# Patient Record
Sex: MALE | Race: WHITE | NOT HISPANIC OR LATINO | Employment: OTHER | URBAN - METROPOLITAN AREA
[De-identification: names, ages, dates, MRNs, and addresses within clinical notes are randomized per-mention and may not be internally consistent; named-entity substitution may affect disease eponyms.]

---

## 2018-04-26 ENCOUNTER — OFFICE VISIT CONVERTED (OUTPATIENT)
Dept: OTHER | Facility: HOSPITAL | Age: 63
End: 2018-04-26
Attending: INTERNAL MEDICINE

## 2018-06-08 ENCOUNTER — OFFICE VISIT CONVERTED (OUTPATIENT)
Dept: CARDIOLOGY | Facility: CLINIC | Age: 63
End: 2018-06-08
Attending: SPECIALIST

## 2018-06-08 ENCOUNTER — CONVERSION ENCOUNTER (OUTPATIENT)
Dept: CARDIOLOGY | Facility: CLINIC | Age: 63
End: 2018-06-08

## 2018-08-14 ENCOUNTER — OFFICE VISIT CONVERTED (OUTPATIENT)
Dept: CARDIOLOGY | Facility: CLINIC | Age: 63
End: 2018-08-14
Attending: SPECIALIST

## 2018-08-23 ENCOUNTER — OFFICE VISIT CONVERTED (OUTPATIENT)
Dept: OTHER | Facility: HOSPITAL | Age: 63
End: 2018-08-23
Attending: INTERNAL MEDICINE

## 2018-09-13 ENCOUNTER — OFFICE VISIT CONVERTED (OUTPATIENT)
Dept: OTHER | Facility: HOSPITAL | Age: 63
End: 2018-09-13
Attending: INTERNAL MEDICINE

## 2018-12-11 ENCOUNTER — OFFICE VISIT CONVERTED (OUTPATIENT)
Dept: CARDIOLOGY | Facility: CLINIC | Age: 63
End: 2018-12-11
Attending: SPECIALIST

## 2019-01-17 ENCOUNTER — OFFICE VISIT CONVERTED (OUTPATIENT)
Dept: OTHER | Facility: HOSPITAL | Age: 64
End: 2019-01-17
Attending: INTERNAL MEDICINE

## 2019-04-12 ENCOUNTER — OFFICE VISIT CONVERTED (OUTPATIENT)
Dept: CARDIOLOGY | Facility: CLINIC | Age: 64
End: 2019-04-12
Attending: SPECIALIST

## 2019-07-18 ENCOUNTER — OFFICE VISIT CONVERTED (OUTPATIENT)
Dept: OTHER | Facility: HOSPITAL | Age: 64
End: 2019-07-18
Attending: INTERNAL MEDICINE

## 2019-10-15 ENCOUNTER — OFFICE VISIT CONVERTED (OUTPATIENT)
Dept: CARDIOLOGY | Facility: CLINIC | Age: 64
End: 2019-10-15
Attending: SPECIALIST

## 2019-10-15 ENCOUNTER — CONVERSION ENCOUNTER (OUTPATIENT)
Dept: OTHER | Facility: HOSPITAL | Age: 64
End: 2019-10-15

## 2019-12-19 ENCOUNTER — OFFICE VISIT CONVERTED (OUTPATIENT)
Dept: OTHER | Facility: HOSPITAL | Age: 64
End: 2019-12-19
Attending: INTERNAL MEDICINE

## 2020-04-28 ENCOUNTER — TELEPHONE CONVERTED (OUTPATIENT)
Dept: CARDIOLOGY | Facility: CLINIC | Age: 65
End: 2020-04-28
Attending: SPECIALIST

## 2020-07-28 ENCOUNTER — OFFICE VISIT CONVERTED (OUTPATIENT)
Dept: CARDIOLOGY | Facility: CLINIC | Age: 65
End: 2020-07-28
Attending: SPECIALIST

## 2020-07-28 ENCOUNTER — CONVERSION ENCOUNTER (OUTPATIENT)
Dept: CARDIOLOGY | Facility: CLINIC | Age: 65
End: 2020-07-28

## 2020-07-30 ENCOUNTER — OFFICE VISIT CONVERTED (OUTPATIENT)
Dept: OTHER | Facility: HOSPITAL | Age: 65
End: 2020-07-30
Attending: INTERNAL MEDICINE

## 2021-02-26 ENCOUNTER — OFFICE VISIT CONVERTED (OUTPATIENT)
Dept: CARDIOLOGY | Facility: CLINIC | Age: 66
End: 2021-02-26
Attending: SPECIALIST

## 2021-04-19 ENCOUNTER — HOSPITAL ENCOUNTER (OUTPATIENT)
Dept: MRI IMAGING | Facility: HOSPITAL | Age: 66
Discharge: HOME | End: 2021-04-19
Attending: ANESTHESIOLOGY

## 2021-05-13 NOTE — PROGRESS NOTES
Progress Note      Patient Name: Beltran Ambrose   Patient ID: [id number]   Sex: Male   YOB: 1955    Primary Care Provider: Darrius Cooper MD   Referring Provider: Edgar Sprague MD    Visit Date: July 28, 2020    Provider: Edgar Sprague MD   Location: [facility]   Location Address: [address] [place] [address], [facility]   [place]  [id number]   Location Phone: [phone]          Chief Complaint  · Coronary artery disease   · Atrial fibrillation       History Of Present Illness  Beltran Ambrose is a 65-year-old male with history of coronary artery disease, status post coronary artery bypass graft surgery. Denies any chest pain or shortness of breath.   CURRENT MEDICATIONS: include Pantoprazole 40 mg daily; magnesium 400 mg daily; Metoclopramide 10 mg b.i.d.; Atenolol 25 mg daily; Diltiazem 50 mg b.i.d.; Eliquis 5 mg b.i.d.; Crestor 40 mg daily; Amitriptyline 50 mg daily; Fenofibrate 160 mg daily; Levothyroxine 137 mcg daily; Lantus; Humalog; vitamin D; Allopurinol 100 mg b.i.d.; folic acid 1 mg daily. The dosage and frequency of the medications were reviewed with the patient.   PAST MEDICAL HISTORY: Atrial fibrillation; chronic renal failure; coronary artery disease with CABG; hyperlipidemia.   FAMILY HISTORY: Positive for diabetes mellitus. Negative for hypertension and heart disease.   PSYCHOSOCIAL HISTORY: He does not drink alcohol and does not use tobacco.       Review of Systems  · Cardiovascular  o Admits  o : swelling (feet, ankles, hands)  o Denies  o : palpitations (fast, fluttering, or skipping beats), shortness of breath while walking or lying flat, chest pain or angina pectoris   · Respiratory  o Denies  o : chronic or frequent cough, asthma or wheezing      Vitals  Date Time BP Position Site L\R Cuff Size HR RR TEMP (F) WT  HT  BMI kg/m2 BSA m2 O2 Sat HC       07/28/2020 10:15 /62 Sitting    68 - R   260lbs 0oz 6'   35.26 2.45           Physical  Examination  · Constitutional  o Appearance  o : Awake, alert, cooperative, pleasant.  · Respiratory  o Inspection of Chest  o : No chest wall deformities, moving equal.  o Auscultation of Lungs  o : Good air entry with vesicular breath sounds.  · Cardiovascular  o Heart  o :   § Auscultation of Heart  § : S1 and S2 regular. No S3. No S4. No murmurs.  o Peripheral Vascular System  o :   § Extremities  § : Peripheral pulses were well felt. No edema. No cyanosis.  · Gastrointestinal  o Abdominal Examination  o : No masses or organomegaly noted.  · EKG  o EKG  o : Done today.  o Indications  o : Atrial fibrillation.  o Results  o : Shows atrial fibrillation with sinus rhythm. Nonspecific ST-T wave changes.           Assessment     ASSESSMENT AND PLAN:  1.  Coronary artery disease, status post coronary artery bypass graft surgery, stable.  Continue current dose of        Atenolol.     2.  Paroxysmal atrial fibrillation, now in sinus rhythm.  Continue Eliquis for stroke prevention.  Continue        Atenolol for rate control.   3.  Hyperlipidemia.  Managed by PMD.  4.  See me back in six months.      MD RITO Kolb/lissy    This note was transcribed by Rubi Vargas.  lissy/RITO  The above service was transcribed by Rubi Vargas, and I attest to the accuracy of the note.  RITO             Electronically Signed by: Yana Vargas-, Other -Author on July 29, 2020 01:54:02 PM  Electronically Co-signed by: Edgar Sprague MD -Reviewer on August 15, 2020 09:38:45 AM

## 2021-05-13 NOTE — PROGRESS NOTES
Quick Note      Patient Name: Beltran Ambrose   Patient ID: [id number]   Sex: Male   YOB: 1955    Primary Care Provider: Darrius Cooper MD   Referring Provider: Edgar Sprague MD    Visit Date: April 28, 2020    Provider: Edgar Sprague MD   Location: [facility]   Location Address: [address] [place] [address] A   [place]  [id number]   Location Phone: [phone]          History Of Present Illness  TELEHEALTH TELEPHONE VISIT  Chief Complaint: Coronary artery disease, Atrial fibrillation   Beltran Ambrose is a 65 year old /White male with a history of coronary artery disease with coronary artery bypass graft surgery. Denies any chest pain. No shortness of breath. Blood pressure is controlled at home. He is presenting for evaluation via telehealth telephone visit. Verbal consent obtained before beginning visit. Telehealth visit due to COVID-19.   Provider spent 5 minutes with the patient during telehealth visit.   The following staff were present during this visit: Provider; Rachael Zurita MA   Past Medical History/Overview of Patient Symptoms     CURRENT MEDICATIONS:  Allopurinol 100 mg b.i.d.; atenolol 25 mg daily; diltiazem 60 mg b.i.d.; Eliquis 5 mg b.i.d.; Crestor 40 mg daily; fenofibrate 160 mg daily; levothyroxine 137 mcg daily; pantoprazole 40 mg daily; magnesium 400 mg t.i.d.; metoclopramide 10 mg b.i.d.; amitriptyline 50 mg daily; Lantus; Humalog; vitamin D; Co-Q10.  Dosage and frequency of the medications were reviewed with the patient.      PAST MEDICAL HISTORY:  Atrial fibrillation; Chronic renal failure; Coronary artery disease with CABG; Hyperlipidemia.      FAMILY HISTORY:  Positive for diabetes mellitus.  Negative for hypertension or heart disease.      PSYCHOSOCIAL HISTORY:  Denies mood changes or depression.  Denies alcohol or tobacco use.      REVIEW OF SYSTEMS:    Cardiovascular:  Denies palpitations (fast, fluttering, or skipping beats),  "swelling (feet, ankles, hands), shortness of breath while walking or lying flat, chest pain or angina pectoris   Respiratory: Denies chronic or frequent cough, asthma or wheezing       Vitals     Per patient, at-home vitals:    Blood pressure 130/76.  Heart rate 81.  Height 6'0\".  Weight 265.           Assessment     ASSESSMENT & PLAN:    1.  Coronary artery disease with history of coronary artery bypass graft surgery, stable.  Continue current dose        of atenolol.    2.  Paroxysmal atrial fibrillation, now in sinus rhythm.  Continue Eliquis for stroke prevention.  Continue atenolol        for rate control.    3.  Hyperlipidemia.  Managed by his PMD.  Continue current dose of Crestor and fenofibrate.  4.  See me back in 6 months.             Electronically Signed by: Alivia Blake-, Other -Author on April 30, 2020 02:40:32 PM  Electronically Co-signed by: Edgar Sprague MD -Reviewer on May 7, 2020 08:52:41 AM  "

## 2021-05-14 VITALS
DIASTOLIC BLOOD PRESSURE: 40 MMHG | HEIGHT: 72 IN | BODY MASS INDEX: 37.25 KG/M2 | HEART RATE: 66 BPM | WEIGHT: 275 LBS | SYSTOLIC BLOOD PRESSURE: 140 MMHG

## 2021-05-14 NOTE — PROGRESS NOTES
Progress Note      Patient Name: Beltran Ambrose   Patient ID: [id number]   Sex: Male   YOB: 1955    Primary Care Provider: Darrius Cooper MD   Referring Provider: Edgar Sprague MD    Visit Date: February 26, 2021    Provider: Edgar Sprague MD   Location: [facility]   Location Address: [address] [place] [address], [address]   [place]  [id number]   Location Phone: [phone]          Chief Complaint  · Coronary artery disease   · Atrial fibrillation       History Of Present Illness  Beltran Ambrose is a 65-year-old male with history of coronary artery disease, status post coronary artery bypass graft surgery. Denies chest pain or shortness of breath.   CURRENT MEDICATIONS: Medications have been reviewed and are as stated.   PAST MEDICAL HISTORY: Atrial fibrillation; chronic renal failure; coronary artery disease with CABG; hyperlipidemia.   PSYCHOSOCIAL HISTORY: Denies alcohol use.      ALLERGIES: No known drug allergies.       Review of Systems  · Cardiovascular  o Admits  o : swelling (feet, ankles, hands), chest pain or angina pectoris   o Denies  o : palpitations (fast, fluttering, or skipping beats), shortness of breath while walking or lying flat  · Respiratory  o Denies  o : chronic or frequent cough      Vitals  Date Time BP Position Site L\R Cuff Size HR RR TEMP (F) WT  HT  BMI kg/m2 BSA m2 O2 Sat FR L/min FiO2 HC       02/26/2021 09:22 /40 Sitting    66 - R   275lbs 0oz 6'   37.3 2.52             Physical Examination  · Constitutional  o Appearance  o : Awake, alert, cooperative, pleasant.  · Respiratory  o Inspection of Chest  o : No chest wall deformities, moving equal.  o Auscultation of Lungs  o : Good air entry with vesicular breath sounds.  · Cardiovascular  o Heart  o :   § Auscultation of Heart  § : S1 and S2 regular. No S3. No S4. No murmurs.  o Peripheral Vascular System  o :   § Extremities  § : Peripheral pulses were well felt. No edema. No  cyanosis.  · Gastrointestinal  o Abdominal Examination  o : No masses or organomegaly noted.          Assessment     ASSESSMENT AND PLAN:  1.  Paroxysmal atrial fibrillation, in sinus rhythm. Continue Eliquis for stroke prevention. Continue Atenolol for        rate control.   2.  Coronary artery disease, status post  coronary artery bypass graft surgery, stable. Continue current dose of        Atenolol.   3.  See me back in 6 months.      Edgar Sprague MD  RITO/pap                Electronically Signed by: Yana Vargas-, Other -Author on March 10, 2021 11:24:01 AM  Electronically Co-signed by: Edgar Sprague MD -Reviewer on March 15, 2021 11:56:43 AM

## 2021-05-15 VITALS
HEART RATE: 66 BPM | BODY MASS INDEX: 34.54 KG/M2 | HEIGHT: 72 IN | SYSTOLIC BLOOD PRESSURE: 132 MMHG | WEIGHT: 255 LBS | DIASTOLIC BLOOD PRESSURE: 66 MMHG

## 2021-05-15 VITALS
WEIGHT: 265 LBS | HEART RATE: 66 BPM | HEIGHT: 72 IN | BODY MASS INDEX: 35.89 KG/M2 | SYSTOLIC BLOOD PRESSURE: 134 MMHG | DIASTOLIC BLOOD PRESSURE: 54 MMHG

## 2021-05-15 VITALS
BODY MASS INDEX: 35.21 KG/M2 | DIASTOLIC BLOOD PRESSURE: 48 MMHG | HEIGHT: 72 IN | HEART RATE: 64 BPM | SYSTOLIC BLOOD PRESSURE: 114 MMHG | WEIGHT: 260 LBS

## 2021-05-15 VITALS
BODY MASS INDEX: 35.21 KG/M2 | SYSTOLIC BLOOD PRESSURE: 132 MMHG | WEIGHT: 260 LBS | DIASTOLIC BLOOD PRESSURE: 62 MMHG | HEART RATE: 68 BPM | HEIGHT: 72 IN

## 2021-05-16 VITALS
SYSTOLIC BLOOD PRESSURE: 130 MMHG | DIASTOLIC BLOOD PRESSURE: 66 MMHG | BODY MASS INDEX: 34.27 KG/M2 | WEIGHT: 253 LBS | HEIGHT: 72 IN | HEART RATE: 64 BPM

## 2021-05-16 VITALS
BODY MASS INDEX: 34.67 KG/M2 | DIASTOLIC BLOOD PRESSURE: 58 MMHG | HEART RATE: 74 BPM | WEIGHT: 256 LBS | HEIGHT: 72 IN | SYSTOLIC BLOOD PRESSURE: 130 MMHG

## 2021-05-28 VITALS
RESPIRATION RATE: 20 BRPM | HEART RATE: 76 BPM | OXYGEN SATURATION: 93 % | WEIGHT: 260.19 LBS | HEART RATE: 72 BPM | SYSTOLIC BLOOD PRESSURE: 122 MMHG | HEART RATE: 67 BPM | OXYGEN SATURATION: 95 % | HEIGHT: 72 IN | RESPIRATION RATE: 20 BRPM | BODY MASS INDEX: 34.89 KG/M2 | BODY MASS INDEX: 34.48 KG/M2 | OXYGEN SATURATION: 96 % | WEIGHT: 257.6 LBS | HEIGHT: 72 IN | TEMPERATURE: 97.8 F | DIASTOLIC BLOOD PRESSURE: 64 MMHG | DIASTOLIC BLOOD PRESSURE: 83 MMHG | SYSTOLIC BLOOD PRESSURE: 121 MMHG | HEIGHT: 72 IN | OXYGEN SATURATION: 96 % | SYSTOLIC BLOOD PRESSURE: 130 MMHG | HEART RATE: 73 BPM | WEIGHT: 255.19 LBS | HEIGHT: 72 IN | TEMPERATURE: 97.2 F | HEART RATE: 60 BPM | WEIGHT: 265.5 LBS | HEART RATE: 67 BPM | OXYGEN SATURATION: 94 % | TEMPERATURE: 97 F | BODY MASS INDEX: 34.56 KG/M2 | DIASTOLIC BLOOD PRESSURE: 64 MMHG | BODY MASS INDEX: 34.42 KG/M2 | DIASTOLIC BLOOD PRESSURE: 58 MMHG | TEMPERATURE: 98.2 F | BODY MASS INDEX: 34.48 KG/M2 | SYSTOLIC BLOOD PRESSURE: 135 MMHG | BODY MASS INDEX: 35.24 KG/M2 | BODY MASS INDEX: 35.96 KG/M2 | TEMPERATURE: 97.3 F | TEMPERATURE: 97 F | SYSTOLIC BLOOD PRESSURE: 135 MMHG | HEIGHT: 72 IN | DIASTOLIC BLOOD PRESSURE: 79 MMHG | HEIGHT: 72 IN | DIASTOLIC BLOOD PRESSURE: 56 MMHG | HEIGHT: 72 IN | WEIGHT: 254.6 LBS | WEIGHT: 254.12 LBS | SYSTOLIC BLOOD PRESSURE: 128 MMHG | DIASTOLIC BLOOD PRESSURE: 70 MMHG | SYSTOLIC BLOOD PRESSURE: 143 MMHG | OXYGEN SATURATION: 97 % | RESPIRATION RATE: 20 BRPM | OXYGEN SATURATION: 98 % | WEIGHT: 254.6 LBS | TEMPERATURE: 97.8 F | HEART RATE: 66 BPM

## 2021-05-28 NOTE — PROGRESS NOTES
Patient: SAYRA GUTIERREZ     Acct: [id number]     Report: #[id number]  UNIT #: [id number]     : 1955    Encounter Date:2018  PRIMARY CARE:   ***Signed***  --------------------------------------------------------------------------------------------------------------------  DATE: 18      Primary Care Provider      Primary Care Provider:  ALYSSA MCGARRY            Referring Physician      Referring Provider:  ALYSSA MCGARRY            Chief Complaint      Follow up Anemia            Subjective      63-year-old white male has a history of anemia after a long history of     complicated medical problems.  Anemia persists however workups have been     negative.      Patient has been doing well.            Patient undergo plasmapheresis for low density lipoprotein disease.            Past Med/Surg History            Past Med/Surg History:   No Hypertension             Diabetes Mellitus             Heart Disease             Blood Clots             No Cancer             No Lung Disease             No Kidney Disease             No Other            Social History      Social History:  No Tobacco Use (quit in ), No Alcohol Use, No Recreational     Drug use, No Other            Allergies      Coded Allergies:             DOPAMINE (Verified  Allergy, Severe, CHEST PAIN, ST ELEVATION, 9/1/15)           Cultivated Oat Pollen (Verified  Allergy, Unknown, NASAL CONGESTION, 9/1/15    )           GRASS POLLEN-ORCHARD GRASS,STD (Verified  Allergy, Unknown, NASAL     CONGESTION, 9/1/15)           OXYCODONE (Verified  Adverse Reaction, Severe, ALOT OF CONFUSION, 9/1/15)            Medications      Medications    Last Reconciled on 18 14:10 by LOU OROZCO MD      Baclofen (Baclofen*) 10 Mg Tablet      10 MG PO BID, #60 TAB 0 Refills         Reported         18       Allopurinol (Zyloprim*) 100 Mg Tablet      100 MG PO BID for 30 Days, #60 TAB 0 Refills         Reported         18       Insulin Glargine  (Lantus VIAL) 100 Units/Ml Vial      20 UNITS SUBQ BID INSULIN, #1 VIAL 0 Refills         Reported         4/26/18       (L-Thyroxin)   No Conflict Check      137 MCG QDAY Y for ANXIETY         Reported         4/26/18       Fenofibrate (Fenofibrate*) 160 Mg Tablet      160 MG PO QDAY, TAB         Reported         4/26/18       Amitriptyline HCl (Amitriptyline HCl) 25 Mg Tablet      25 MG PO HS, #30 TAB         Reported         4/26/18       Rosuvastatin Calcium (Crestor*) 40 Mg Tablet      40 MG PO HS, #30 TAB 0 Refills         Reported         4/26/18       Levothyroxine (Levothyroxine) 0.112 Mg Tablet      0.112 MG PO QDAY@07, #30 TAB 0 Refills         Reported         4/26/18       Atenolol (Atenolol*) 25 Mg Tablet      25 MG PO QDAY, #30 TAB 0 Refills         Reported         4/26/18       Aspirin (Aspirin*) 81 Mg Tab.chew      81 MG PO QDAY, #30 TAB.CHEW 0 Refills         Reported         4/26/18       Clopidogrel Bisulfate (Plavix) 75 Mg Tablet      75 MG PO QDAY, #30 TAB 0 Refills         Reported         4/26/18       Metoclopramide (Metoclopramide) 10 Mg Tablet      10 MG PO TID, TAB         Reported         4/26/18       Hydrocodone/Acetaminophen 7.5/325 MG (Hydrocodone/Acetaminophen 7.5/325 MG) 1     Each Tablet      1 TAB PO Q4-6H Y for BREAKTHROUGH PAIN, TAB         Reported         4/26/18       Cyanocobalamin (Cyanocobalamin Injection) 1,000 Mcg/1 Ml Vial      1000 MCG IM Q30DAY, #1 VIAL         Reported         4/26/18       Magnesium Amino Acid Chelate (Magnesium*) 100 Mg Tablet      100 MG PO QID, TAB         Reported         4/26/18       Oxymetazoline Hcl (Afrin Adult Strength) 15 Ml [place]      1 SPRAYS NARE EACH BID Y for CONGESTION, #1 BOTTLE         Reported         4/26/18       Pantoprazole (Protonix*) 40 Mg Tablet.dr      40 MG PO QDAY@07, #30 TAB 0 Refills         Reported         4/26/18      Current Medications      Current Medications Reviewed 4/26/18            Pain Assessment       Pain Intensity:  0      Description:  None            Review of Systems      General:  No Anxiety, No Fatigue Scale:, No Pain Scale:, No Fever, No Other      HEENT:  No Dysphagia, No Hearing Changes, No Rhinorrhea, No Tinnitus, No Visual     Changes, No Nasal Congestion, No Epistaxis, No Other      Respiratory:  No Cough, No Shortness of Air, No Sputum Changes, No Wheezing, No     Hemoptysis, No Congestion, No Other      Cardiovascular:  No Chest Pain, No Pedal Edema, No Orthopnea, No Palpitations,     No Chest Pressure, No Dizziness, No Other      Gastrointestinal:  No Nausea, No Vomiting, No Dysphagia, No Constipation, No     Diarrhea, Appetite Good, No Appetite Fair, No Appetite Poor, No Early Satiety,     No Other      Genitourinary:  Nocturia (2 or 3), No Dysuria, No Other      Musculoskeletal:  No Joint Effusions, Joint Tenderness, Joint Stiffness (right     hand), No Myalgias, Aches, Pains, No Other      Endocrine:  No Heat Intolerance, No Cold Intolerance, No Fatigue, No Blood     Sugar Control, No Other      Hematologic:  Bleeding, No Bruising, No Swollen Glands, No Other      Allergic/Immunologic:  No Hives, No Throat closing off, No Nasal drip, No Itchy     eyes, No Hay fever, No Other      Psychological:  No Anxiety, No Depression, No Other      Neurological:  No Headaches, No Dizziness, No Weakness, No Numbness, No Other      Skin:  No Rash, No Open Wounds, No Edema, No Other            Vitals      Height 6 ft 0 in / 182.88 cm      Weight 260 lbs 3 oz / 118.922025 kg      BSA 2.49 m2      BMI 35.3 kg/m2      Temperature 97.3 F / 36.28 C - Temporal      Pulse 73      Blood Pressure 135/83 Sitting, Left Arm      Pulse Oximetry 96%, room air            Exam      Constitutional:  No acute distress, Conversant, Pleasant, No Weakness      Eyes:  Anicteric sclerae, Palpebral Conjunctivae, YINA      HENT:  Oropharynx clear, No Erythema, Buccal mucosae (pink)      Neck:  Supple, Full Range of Motion       Cardiovascular:  RRR, No Murmurs, Normal PMI, No Peripheral Edema      Lungs:  Clear to Ausculation, Normal Respiratory Effort      Abdomen:  Soft, NABS, No Tenderness      Chest:  Other (symmetrical and equal)      Lymphatic:  No Neck      Extremities:  No digital cyanosis, Normal gait, Other (no deformity no mutation     range of motion)      Neurological:  Cranial Nerve II-XII (Intact), No Focal Sensory deficits      Psychological:  Appropriate affect, Intact judgement, Alert      Skin:  Other (no dermatosis)            Lab Results      White count 5, hemoglobin 11.4, hematocrit 34.2, normal indicis, platelet count     112,000      Iron profile normal, ferritin elevated at 420 specimen lipemic            Impression/Problem List      Normochromic normocytic anemia      Low-density lipoprotein disease undergoing plasmapheresis      Diabetes mellitus      Hypertension      Coronary artery disease      Hypothyroidism      Obstructive sleep apnea      Gout      Hypomagnesemia      Notes      New Medications      * PANTOPRAZOLE (Protonix*) 40 MG TABLET.DR: 40 MG PO QDAY@07 #30       Instructions: Take on an empty stomach.      * OXYMETAZOLINE HCL (Afrin Adult Strength) 15 ML SPRAY: 1 SPRAYS NARE EACH BID     PRN CONGESTION #1      * Magnesium Amino Acid Chelate (Magnesium*) 100 MG TABLET: 100 MG PO QID      * Cyanocobalamin (Cyanocobalamin Injection) 1,000 MCG/1 ML VIAL: 1,000 MCG IM     Q30DAY #1      * Hydrocodone/Acetaminophen 7.5/325 MG 1 EACH TABLET: 1 TAB PO Q4-6H PRN     BREAKTHROUGH PAIN      * Metoclopramide 10 MG TABLET: 10 MG PO TID      * Clopidogrel Bisulfate (Plavix) 75 MG TABLET: 75 MG PO QDAY #30      * Aspirin (Aspirin*) 81 MG TAB.CHEW: 81 MG PO QDAY #30      * ATENOLOL (Atenolol*) 25 MG TABLET: 25 MG PO QDAY #30      * Levothyroxine 0.112 MG TABLET: 0.112 MG PO QDAY@07 #30      * Rosuvastatin Calcium (Crestor*) 40 MG TABLET: 40 MG PO HS #30      * Amitriptyline HCl 25 MG TABLET: 25 MG PO HS #30       * Fenofibrate (Fenofibrate*) 160 MG TABLET: 160 MG PO QDAY      * (L-Thyroxin): 137 MCG QDAY PRN ANXIETY      * INSULIN GLARGINE (Lantus VIAL) 100 UNITS/ML VIAL: 20 UNITS SUBQ BID INSULIN #1      * ALLOPURINOL (Zyloprim*) 100 MG TABLET: 100 MG PO BID 30 Days #60      * BACLOFEN (Baclofen*) 10 MG TABLET: 10 MG PO BID #60            Plan      Continue to follow CBC.  Repeat anemia profile.  May need bone marrow studies     to determine etiology of anemia            Patient Education:        Anemia            PREVENTION      Hx Influenza Vaccination:  Yes (FALL 2014)      Date Influenza Vaccine Given:  Nov 13, 2017      2 or More Falls Past Year?:  No      Fall Past Year with Injury?:  No      Hx Pneumococcal Vaccination:  Yes (JUNE 2015)      Encouraged to follow-up with:  PCP regarding preventative exams.      Chart initiated by      Marely Cheek MA                 Disclaimer: Converted document may not contain table formatting or lab diagrams. Please see [facility] for the authenticated document.

## 2021-05-28 NOTE — PROGRESS NOTES
Patient: SAYRA GUTIERREZ     Acct: [id number]     Report: #[id number]  UNIT #: [id number]     : 1955    Encounter Date:2019  PRIMARY CARE:   ***Signed***  --------------------------------------------------------------------------------------------------------------------  DATE: 19      Primary Care Provider      Primary Care Provider:  ALYSSA MCGARRY            Referring Physician      Referring Provider:  ALYSSA MCGARRY            Chief Complaint      FU Anemia            Past Med/Surg History            Past Med/Surg History:   No Hypertension             Diabetes Mellitus             Heart Disease             Blood Clots             No Cancer             No Lung Disease             No Kidney Disease             No Other            Social History      Social History:  No Tobacco Use (quit in ), No Alcohol Use, No Recreational     Drug use, No Other            Allergies      Coded Allergies:             DOPAMINE (Verified  Allergy, Severe, CHEST PAIN, ST ELEVATION, 9/1/15)           Cultivated Oat Pollen (Verified  Allergy, Unknown, NASAL CONGESTION,     9/1/15)           GRASS POLLEN-ORCHARD GRASS,STD (Verified  Allergy, Unknown, NASAL C    ONGESTION, 9/1/15)           OXYCODONE (Verified  Adverse Reaction, Severe, ALOT OF CONFUSION, 9/1/15)            Medications      Medications    Last Reconciled on 18 14:48 by LOU OROZCO MD      Insulin Lispro (HumaLOG VIAL*) 100 Units/Ml Vial      20 UNITS SUBQ BID INSULIN, #1 VIAL 0 Refills         Reported         19       Levothyroxine (Levothyroxine) 0.137 Mg Tablet      0.137 MG PO QDAY@07, #30 TAB 0 Refills         Reported         19       Rosuvastatin Calcium (Crestor*) 40 Mg Tablet      40 MG PO QDAY, #30 TAB 0 Refills         Reported         19       Apixaban (Eliquis) 5 Mg Tablet      5 MG PO BID for 30 Days, #60 TAB         Reported         19       Diltiazem (Diltiazem) 60 Mg Tab      60 MG PO BID, TAB          Reported         1/17/19       Cyanocobalamin (Cyanocobalamin Injection) 1,000 Mcg/1 Ml Vial      1000 MCG IM Q30DAY, #1 VIAL         Reported         1/17/19       Magnesium Oxide (Magnesium Oxide*) 400 Mg Tablet      400 MG PO TID, #60 TAB 0 Refills         Reported         1/17/19       Allopurinol (Zyloprim*) 100 Mg Tablet      100 MG PO BID for 30 Days, #60 TAB 0 Refills         Reported         4/26/18       Insulin Glargine (Lantus VIAL) 100 Units/Ml Vial      20 UNITS SUBQ BID INSULIN, #1 VIAL 0 Refills         Reported         4/26/18       Fenofibrate (Fenofibrate*) 160 Mg Tablet      160 MG PO QDAY, TAB         Reported         4/26/18       Amitriptyline HCl (Amitriptyline HCl) 25 Mg Tablet      25 MG PO HS, #30 TAB         Reported         4/26/18       Atenolol (ATENOLOL) 25 Mg Tablet      25 MG PO QDAY, #30 TAB 0 Refills         Reported         4/26/18       Metoclopramide (Metoclopramide) 10 Mg Tablet      10 MG PO BID, TAB         Reported         4/26/18       Oxymetazoline Hcl (AFRIN) 15 Ml Spray      1 SPRAYS NARE EACH BID PRN for CONGESTION, #1 BOTTLE         Reported         4/26/18       Pantoprazole (Protonix*) 40 Mg Tablet.dr      40 MG PO QDAY@07, #30 TAB 0 Refills         Reported         4/26/18      Current Medications      Current Medications Reviewed 7/18/19            Pain Assessment      Pain Intensity:  0      Description:  None            Review of Systems      General:  Anxiety, Fatigue Scale: (0), Pain Scale: (8)      HEENT:  No Dysphagia      Respiratory:  No Cough; Shortness of Air, Wheezing      Cardiovascular:  No Chest Pain      Gastrointestinal:  No Nausea; Appetite Good (good)      Genitourinary:  No Nocturia      Musculoskeletal:  No Joint Effusions; Aches, Pains (back, legs)      Endocrine:  No Heat Intolerance      Hematologic:  No Bleeding      Allergic/Immunologic:  No Hives      Psychological:  Anxiety      Neurological:  No Headaches; Numbness ((L) hand)      Skin:   No Rash, No Open Wounds            Vitals      Height 6 ft 0 in / 182.88 cm      Weight 254 lbs 9.6 oz / 115.650448 kg      BSA 2.36 m2      BMI 34.5 kg/m2      Temperature 97.8 F / 36.56 C      Pulse 60      Blood Pressure 128/64      Pulse Oximetry 97%            Impression/Problem List            Pancytopenia new onset      Iron deficiency new-onset      Normochromic normocytic anemia      Low-density lipoprotein disease undergoing plasmapheresis      Diabetes mellitus      Hypertension      Coronary artery disease      Hypothyroidism      Obstructive sleep apnea      Gout      Hypomagnesemia            Chart Signed As is by S Administration      Notes      Changed Medications      * Metoclopramide 10 MG TABLET: 10 MG PO BID      * MAGNESIUM OXIDE (Magnesium Oxide*) 400 MG TABLET: 400 MG PO TID #60            Plan      Bone marrow biopsy and aspiration.  Procedure was discussed with patient and     wife including risks.  This will be done on Monday at 9 o'clock stent time.      [id number] mg IV to be given tomorrow      Ehrlichia serology; kacie mountain spotted fever serology      Ferrous sulfate 1 tablet 3 times a day      Vitamin C 500 mg 2 times a day      CT scan of the abdomen and pelvis for splenomegaly      Patient return in 2 weeks.            Patient Education:        Anemia            PREVENTION      Date Influenza Vaccine Given:  Nov 13, 2017      2 or More Falls Past Year?:  No      Fall Past Year with Injury?:  No      Encouraged to follow-up with:  PCP regarding preventative exams.      Chart initiated by      CARLA Hagen MA            Electronically signed by Christel Rahman  06/09/2020 13:05  Electronically signed by JOEY SOLIS  06/10/2020 07:43       Disclaimer: Converted document may not contain table formatting or lab diagrams. Please see [facility] for the authenticated document.

## 2021-05-28 NOTE — PROGRESS NOTES
Patient: SAYRA GUTIERREZ     Acct: [id number]     Report: #[id number]  UNIT #: [id number]     : 1955    Encounter Date:2018  PRIMARY CARE:   ***Signed***  --------------------------------------------------------------------------------------------------------------------  DATE: 18      Primary Care Provider      Primary Care Provider:  ALYSSA MCGARRY            Referring Physician      Referring Provider:  ALYSSA MCGARRY            Chief Complaint      Follow up Anemia            Subjective      63-year-old white male was initially referred to me for anemia after a very long    complicated medical history.  Patient had history of pancreatitis status post     pancreatectomy complicated by gallbladder rupture as well as a rupture of a     pancreatic cyst pseudocyst.  Patient had gastroparesis necessitating PEG tube     insertion.            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            Past Med/Surg History            Past Med/Surg History:   No Hypertension             Diabetes Mellitus             Heart Disease             Blood Clots             No Cancer             No Lung Disease             No Kidney Disease             No Other            Social History      Social History:  No Tobacco Use (quit in ), No Alcohol Use, No Recreational     Drug use, No Other            Allergies      Coded Allergies:             DOPAMINE (Verified  Allergy, Severe, CHEST PAIN, ST ELEVATION, 9/1/15)           Cultivated Oat Pollen (Verified  Allergy, Unknown, NASAL CONGESTION,     9/1/15)           GRASS POLLEN-ORCHARD GRASS,STD (Verified  Allergy, Unknown, NASAL     CONGESTION, 9/1/15)           OXYCODONE (Verified  Adverse Reaction, Severe, ALOT OF CONFUSION, 9/1/15)            Medications      Medications    Last Reconciled on 18 14:48 by LOU OROZCO MD      (martazaoine)   No Conflict Check      15 TAB PO         Reported         18       Allopurinol  (Zyloprim*) 100 Mg Tablet      100 MG PO BID for 30 Days, #60 TAB 0 Refills         Reported         4/26/18       Insulin Glargine (Lantus VIAL) 100 Units/Ml Vial      20 UNITS SUBQ BID INSULIN, #1 VIAL 0 Refills         Reported         4/26/18       Fenofibrate (Fenofibrate*) 160 Mg Tablet      160 MG PO QDAY, TAB         Reported         4/26/18       Amitriptyline HCl (Amitriptyline HCl) 25 Mg Tablet      25 MG PO HS, #30 TAB         Reported         4/26/18       Atenolol (Atenolol*) 25 Mg Tablet      25 MG PO QDAY, #30 TAB 0 Refills         Reported         4/26/18       Metoclopramide (Metoclopramide) 10 Mg Tablet      10 MG PO TID, TAB         Reported         4/26/18       Hydrocodone/Acetaminophen 7.5/325 MG (Hydrocodone/Acetaminophen 7.5/325 MG) 1     Each Tablet      1 TAB PO Q4-6H PRN for BREAKTHROUGH PAIN, TAB         Reported         4/26/18       Cyanocobalamin (Cyanocobalamin Injection) 1,000 Mcg/1 Ml Vial      1000 MCG IM Q30DAY, #1 VIAL         Reported         4/26/18       Oxymetazoline Hcl (Afrin Adult Strength) 15 Ml Mason      1 SPRAYS NARE EACH BID PRN for CONGESTION, #1 BOTTLE         Reported         4/26/18       Pantoprazole (Protonix*) 40 Mg Tablet.dr      40 MG PO QDAY@07, #30 TAB 0 Refills         Reported         4/26/18      Current Medications      Current Medications Reviewed 8/23/18            Pain Assessment      Pain Intensity:  0      Description:  None            Review of Systems      General:  No Anxiety, No Fatigue Scale:, No Pain Scale:, No Fever, No Other      HEENT:  No Dysphagia, No Hearing Changes, No Rhinorrhea, No Tinnitus, No Visual     Changes, No Nasal Congestion, No Epistaxis, No Other      Respiratory:  No Cough, No Shortness of Air, No Sputum Changes, No Wheezing, No     Hemoptysis, No Congestion, No Other      Cardiovascular:  No Chest Pain, No Pedal Edema, No Orthopnea, No Palpitations,     No Chest Pressure, No Dizziness, No Other      Gastrointestinal:   No Nausea, No Vomiting, No Dysphagia, No Constipation, No     Diarrhea; Appetite Good; No Appetite Fair, No Appetite Poor, No Early Satiety,     No Other      Genitourinary:  No Nocturia, No Dysuria, No Other      Musculoskeletal:  No Joint Effusions, No Joint Tenderness, No Joint Stiffness,     No Myalgias, No Aches, No Pains, No Other      Endocrine:  No Heat Intolerance, No Cold Intolerance, No Fatigue, No Blood Sugar    Control, No Other      Hematologic:  Bleeding, Bruising; No Swollen Glands, No Other      Allergic/Immunologic:  No Hives, No Throat closing off, No Nasal drip, No Itchy     eyes, No Hay fever, No Other      Psychological:  No Anxiety, No Depression, No Other      Neurological:  No Headaches, No Dizziness, No Weakness, No Numbness, No Other      Skin:  No Rash, No Open Wounds, No Edema, No Other            Vitals      Height 6 ft 0 in / 182.88 cm      Weight 254 lbs 2 oz / 115.035543 kg      BSA 2.46 m2      BMI 34.5 kg/m2      Temperature 97.8 F / 36.56 C - Temporal      Pulse 67      Blood Pressure 121/64 Sitting, Left Arm      Pulse Oximetry 95%, room air            Exam      Constitutional:  No acute distress, Conversant, Pleasant      Eyes:  Anicteric sclerae, Palpebral Conjunctivae (pink), YINA      HENT:  Oropharynx clear; No Erythema; Buccal mucosae (pink), Other (hard of     hearing)      Neck:  Supple, Full Range of Motion      Cardiovascular:  RRR; No Murmurs; Normal PMI; No Peripheral Edema      Lungs:  Clear to Ausculation, Normal Respiratory Effort      Abdomen:  Soft, NABS; No Tenderness      Chest:  Other (symmetrical and equal)      Extremities:  No digital cyanosis, Normal gait, Other (no deformity no     limitation range of motion)      Neurological:  Cranial Nerve II-XII (Intact); No Focal Sensory deficits      Psychological:  Appropriate affect, Appropriate mood, Intact judgement, Alert      Skin:  Other (no dermatosis)            Lab Results      White count 3.6,   hemoglobin 9.7, hematocrit 31.2, platelet count 88,000      Iron 34, percent saturation 11            Impression/Problem List            Pancytopenia new onset      Iron deficiency new-onset      Normochromic normocytic anemia      Low-density lipoprotein disease undergoing plasmapheresis      Diabetes mellitus      Hypertension      Coronary artery disease      Hypothyroidism      Obstructive sleep apnea      Gout      Hypomagnesemia      Notes      New Medications      * (martazaoine): 15 TAB PO            Plan      Bone marrow biopsy and aspiration.  Procedure was discussed with patient and     wife including risks.  This will be done on Monday at 9 o'clock stent time.      [id number] mg IV to be given tomorrow      Ehrlichia serology; kacei mountain spotted fever serology      Ferrous sulfate 1 tablet 3 times a day      Vitamin C 500 mg 2 times a day      CT scan of the abdomen and pelvis for splenomegaly      Patient return in 2 weeks.            Patient Education:        Anemia            PREVENTION      Hx Influenza Vaccination:  Yes (FALL 2014)      Date Influenza Vaccine Given:  Nov 13, 2017      Hx Pneumococcal Vaccination:  Yes (JUNE 2015)      Encouraged to follow-up with:  PCP regarding preventative exams.      Chart initiated by      Marely Cheek MA                 Disclaimer: Converted document may not contain table formatting or lab diagrams. Please see [employer] for the authenticated document.

## 2021-05-28 NOTE — PROGRESS NOTES
Patient: SAYRA GUTIERREZ     Acct: [id number]     Report: #[id number]  UNIT #: [id number]     : 1955    Encounter Date:2019  PRIMARY CARE:   ***Signed***  --------------------------------------------------------------------------------------------------------------------  DATE: 19      Primary Care Provider      Primary Care Provider:  ALYSSA MCGARRY            Referring Physician      Referring Provider:  ALSYSA MCGARRY            Chief Complaint      FU Anemia            Past Med/Surg History            Past Med/Surg History:   No Hypertension             Diabetes Mellitus             Heart Disease             Blood Clots             No Cancer             No Lung Disease             No Kidney Disease             No Other            Social History      Social History:  No Tobacco Use (quit in ), No Alcohol Use, No Recreational     Drug use, No Other            Allergies      Coded Allergies:             DOPAMINE (Verified  Allergy, Severe, CHEST PAIN, ST ELEVATION, 9/1/15)           Cultivated Oat Pollen (Verified  Allergy, Unknown, NASAL CONGESTION,     9/1/15)           GRASS POLLEN-ORCHARD GRASS,STD (Verified  Allergy, Unknown, NASAL C    ONGESTION, 9/1/15)           OXYCODONE (Verified  Adverse Reaction, Severe, ALOT OF CONFUSION, 9/1/15)            Medications      Medications    Last Reconciled on 18 14:48 by LOU OROZCO MD      Insulin Lispro (HumaLOG VIAL*) 100 Units/Ml Vial      20 UNITS SUBQ BID INSULIN, #1 VIAL 0 Refills         Reported         19       Levothyroxine (Levothyroxine) 0.137 Mg Tablet      0.137 MG PO QDAY@07, #30 TAB 0 Refills         Reported         19       Rosuvastatin Calcium (Crestor*) 40 Mg Tablet      40 MG PO QDAY, #30 TAB 0 Refills         Reported         19       Apixaban (Eliquis) 5 Mg Tablet      5 MG PO BID for 30 Days, #60 TAB         Reported         19       Diltiazem (Diltiazem) 60 Mg Tab      60 MG PO BID, TAB          Reported         1/17/19       Cyanocobalamin (Cyanocobalamin Injection) 1,000 Mcg/1 Ml Vial      1000 MCG IM Q30DAY, #1 VIAL         Reported         1/17/19       Magnesium Oxide (Magnesium Oxide*) 400 Mg Tablet      400 MG PO BID, #60 TAB 0 Refills         Reported         1/17/19       (martazaoine)   No Conflict Check      15 TAB PO         Reported         8/23/18       Allopurinol (Zyloprim*) 100 Mg Tablet      100 MG PO BID for 30 Days, #60 TAB 0 Refills         Reported         4/26/18       Insulin Glargine (Lantus VIAL) 100 Units/Ml Vial      20 UNITS SUBQ BID INSULIN, #1 VIAL 0 Refills         Reported         4/26/18       Fenofibrate (Fenofibrate*) 160 Mg Tablet      160 MG PO QDAY, TAB         Reported         4/26/18       Amitriptyline HCl (Amitriptyline HCl) 25 Mg Tablet      25 MG PO HS, #30 TAB         Reported         4/26/18       Atenolol (ATENOLOL) 25 Mg Tablet      25 MG PO QDAY, #30 TAB 0 Refills         Reported         4/26/18       Metoclopramide (Metoclopramide) 10 Mg Tablet      10 MG PO TID, TAB         Reported         4/26/18       Hydrocodone/Acetaminophen 7.5/325 MG (Hydrocodone/Acetaminophen 7.5/325 MG) 1     Each Tablet      1 TAB PO Q4-6H PRN for BREAKTHROUGH PAIN, TAB         Reported         4/26/18       Oxymetazoline Hcl (AFRIN) 15 Ml Spray      1 SPRAYS NARE EACH BID PRN for CONGESTION, #1 BOTTLE         Reported         4/26/18       Pantoprazole (Protonix*) 40 Mg Tablet.dr      40 MG PO QDAY@07, #30 TAB 0 Refills         Reported         4/26/18      Current Medications      Current Medications Reviewed 1/17/19            Pain Assessment      Pain Intensity:  0      Description:  None            Review of Systems      General:  No Anxiety; Fatigue Scale: (8), Pain Scale: (3)      HEENT:  No Dysphagia      Respiratory:  No Cough, No Shortness of Air      Cardiovascular:  No Chest Pain      Gastrointestinal:  No Nausea, No Vomiting; Appetite Good (good)      Genitourinary:   No Nocturia      Musculoskeletal:  No Joint Effusions      Endocrine:  No Heat Intolerance      Hematologic:  No Bleeding, No Bruising      Allergic/Immunologic:  No Hives, No Throat closing off      Psychological:  No Anxiety, No Depression      Neurological:  No Headaches      Skin:  No Rash            Vitals      Height 6 ft 0 in / 182.88 cm      Weight 257 lbs 9.6 oz / 116.728689 kg      BSA 2.37 m2      BMI 34.9 kg/m2      Temperature 97.0 F / 36.11 C      Pulse 67      Respirations 20      Blood Pressure 130/56      Pulse Oximetry 98%            Impression/Problem List            Pancytopenia new onset      Iron deficiency new-onset      Normochromic normocytic anemia      Low-density lipoprotein disease undergoing plasmapheresis      Diabetes mellitus      Hypertension      Coronary artery disease      Hypothyroidism      Obstructive sleep apnea      Gout      Hypomagnesemia            Chart Signed As is by LSS Administration      Notes      New Medications      * MAGNESIUM OXIDE (Magnesium Oxide*) 400 MG TABLET: 400 MG PO TID #60      * Cyanocobalamin (Cyanocobalamin Injection) 1,000 MCG/1 ML VIAL: 1,000 MCG IM       Q30DAY #1      * dilTIAZem HCL 60 MG TAB: 60 MG PO BID      * Apixaban (Eliquis) 5 MG TABLET: 5 MG PO BID 30 Days #60      * Rosuvastatin Calcium (Crestor*) 40 MG TABLET: 40 MG PO QDAY #30      * Levothyroxine 0.137 MG TABLET: 0.137 MG PO QDAY@07 #30      * INSULIN LISPRO (HumaLOG VIAL) 100 UNITS/ML VIAL: 20 UNITS SUBQ BID INSULIN #1            Plan      Bone marrow biopsy and aspiration.  Procedure was discussed with patient and     wife including risks.  This will be done on Monday at 9 o'clock stent time.      [id number] mg IV to be given tomorrow      Ehrlichia serology; kacie mountain spotted fever serology      Ferrous sulfate 1 tablet 3 times a day      Vitamin C 500 mg 2 times a day      CT scan of the abdomen and pelvis for splenomegaly      Patient return in 2 weeks.             Patient Education:        Anemia            PREVENTION      Hx Influenza Vaccination:  Yes (FALL 2014)      Date Influenza Vaccine Given:  Nov 13, 2017      2 or More Falls Past Year?:  No      Fall Past Year with Injury?:  No      Hx Pneumococcal Vaccination:  Yes (JUNE 2015)      Encouraged to follow-up with:  PCP regarding preventative exams.      Chart initiated by      CARLA Hagen MA            Electronically signed by Christel Rahman  06/09/2020 13:05  Electronically signed by JOEY SOLIS  06/10/2020 07:43       Disclaimer: Converted document may not contain table formatting or lab diagrams. Please see [employer] for the authenticated document.

## 2021-05-28 NOTE — PROGRESS NOTES
Patient: SAYRA GUTIERREZ     Acct: [id number]     Report: #[id number]  UNIT #: [id number]     : 1955    Encounter Date:2019  PRIMARY CARE:   ***Signed***  --------------------------------------------------------------------------------------------------------------------  DATE: 19      Primary Care Provider      Primary Care Provider:  ALYSSA MCGARRY            Referring Physician      Referring Provider:  ALYSSA MCGARRY            Chief Complaint      FU Anemia            Past Med/Surg History            Past Med/Surg History:   No Hypertension             Diabetes Mellitus             Heart Disease             Blood Clots             Cancer (basal cell)             No Lung Disease             No Kidney Disease             No Other            Social History      Social History:  No Tobacco Use (quit in ), No Alcohol Use, No Recreational     Drug use, No Other            Allergies      Coded Allergies:             DOPAMINE (Verified  Allergy, Severe, CHEST PAIN, ST ELEVATION, 9/1/15)           Cultivated Oat Pollen (Verified  Allergy, Unknown, NASAL CONGESTION,     9/1/15)           GRASS POLLEN-ORCHARD GRASS,STD (Verified  Allergy, Unknown, NASAL     CONGESTION, 9/1/15)           OXYCODONE (Verified  Adverse Reaction, Severe, ALOT OF CONFUSION, 9/1/15)            Medications      Medications    Last Reconciled on 18 14:48 by LOU OROZCO MD      Ubidecarenone (Co Q-10) 200 Mg Cap      200 MG PO QDAY, CAP         Reported         19       Ergocalciferol (Ergocalciferol*) 50,000 Unit Capsule      67296 UNITS PO SA@09, #4 CAP 0 Refills         Reported         19       Insulin Lispro (HumaLOG VIAL) 100 Units/Ml Vial      20 UNITS SUBQ BID INSULIN, #1 VIAL 0 Refills         Reported         19       Levothyroxine (Levothyroxine) 0.137 Mg Tablet      0.137 MG PO QDAY@07, #30 TAB 0 Refills         Reported         19       Rosuvastatin Calcium (Crestor*) 40 Mg Tablet       40 MG PO QDAY, #30 TAB 0 Refills         Reported         1/17/19       Apixaban (Eliquis) 5 Mg Tablet      5 MG PO BID for 30 Days, #60 TAB         Reported         1/17/19       dilTIAZem HCL (dilTIAZem HCL) 60 Mg Tab      60 MG PO BID, TAB         Reported         1/17/19       Cyanocobalamin (Cyanocobalamin Injection) 1,000 Mcg/1 Ml Vial      1000 MCG IM Q30DAY, #1 VIAL         Reported         1/17/19       Magnesium Oxide (Magnesium Oxide*) 400 Mg Tablet      400 MG PO TID, #60 TAB 0 Refills         Reported         1/17/19       Allopurinol (Zyloprim*) 100 Mg Tablet      100 MG PO BID for 30 Days, #60 TAB 0 Refills         Reported         4/26/18       Insulin Glargine (Lantus VIAL) 100 Units/Ml Vial      20 UNITS SUBQ BID INSULIN, #1 VIAL 0 Refills         Reported         4/26/18       Fenofibrate (Fenofibrate*) 160 Mg Tablet      160 MG PO QDAY, TAB         Reported         4/26/18       Amitriptyline HCl (Amitriptyline HCl) 25 Mg Tablet      50 MG PO HS, #30 TAB         Reported         4/26/18       Atenolol (ATENOLOL) 25 Mg Tablet      25 MG PO QDAY, #30 TAB 0 Refills         Reported         4/26/18       Metoclopramide (Metoclopramide) 10 Mg Tablet      10 MG PO BID, TAB         Reported         4/26/18       Oxymetazoline Hcl (AFRIN) 15 Ml Spray      1 SPRAYS NARE EACH BID PRN for CONGESTION, #1 BOTTLE         Reported         4/26/18       Pantoprazole (Protonix*) 40 Mg Tablet.dr      40 MG PO QDAY@07, #30 TAB 0 Refills         Reported         4/26/18      Current Medications      Current Medications Reviewed 12/19/19            Pain Assessment      Pain Intensity:  0      Description:  None            Review of Systems      General:  No Anxiety; Fatigue Scale: (0), Pain Scale: (0)      HEENT:  No Dysphagia      Respiratory:  No Cough, No Shortness of Air      Cardiovascular:  No Chest Pain      Gastrointestinal:  No Nausea; Appetite Good (Good)      Genitourinary:  No Nocturia       Musculoskeletal:  No Joint Effusions      Endocrine:  No Heat Intolerance, No Cold Intolerance      Hematologic:  No Bleeding      Allergic/Immunologic:  No Hives      Psychological:  No Anxiety      Neurological:  No Headaches, No Dizziness      Skin:  No Rash            Vitals      Height 6 ft 0 in / 182.88 cm      Weight 265 lbs 8 oz / 120.882484 kg      BSA 2.40 m2      BMI 36.0 kg/m2      Temperature 97.2 F / 36.22 C      Pulse 66      Respirations 20      Blood Pressure 135/70      Pulse Oximetry 96%            Impression/Problem List            Pancytopenia new onset      Iron deficiency new-onset      Normochromic normocytic anemia      Low-density lipoprotein disease undergoing plasmapheresis      Diabetes mellitus      Hypertension      Coronary artery disease      Hypothyroidism      Obstructive sleep apnea      Gout      Hypomagnesemia            Chart signed As is by LSS Administration      Notes      New Medications      * Ergocalciferol 50,000 UNIT CAPSULE: 50,000 UNITS PO SA@09 #4      * Ubidecarenone (Co Q-10) 200 MG CAP: 200 MG PO QDAY      Changed Medications      * Amitriptyline HCl 25 MG TABLET: 50 MG PO HS #30            Plan      Bone marrow biopsy and aspiration.  Procedure was discussed with patient and     wife including risks.  This will be done on Monday at 9 o'clock stent time.      [id number] mg IV to be given tomorrow      Ehrlichia serology; kacie mountain spotted fever serology      Ferrous sulfate 1 tablet 3 times a day      Vitamin C 500 mg 2 times a day      CT scan of the abdomen and pelvis for splenomegaly      Patient return in 2 weeks.            Patient Education:        Anemia            PREVENTION      Hx Influenza Vaccination:  Yes      Date Influenza Vaccine Given:  Nov 1, 2019      Influenza Vaccine Declined:  No      2 or More Falls Past Year?:  No      Fall Past Year with Injury?:  No      Encouraged to follow-up with:  PCP regarding preventative exams.      Chart  initiated by      CARLA Hagen MA            Electronically signed by Christel Rahman  06/09/2020 13:05  Electronically signed by ADMINISTRATION,JOEY  06/10/2020 07:43       Disclaimer: Converted document may not contain table formatting or lab diagrams. Please see [employer] for the authenticated document.

## 2021-05-28 NOTE — PROGRESS NOTES
Patient: SAYRA GUTIERREZ     Acct: [id number]     Report: #[id number]  UNIT #: [id number]     : 1955    Encounter Date:2020  PRIMARY CARE:   ***Signed***  --------------------------------------------------------------------------------------------------------------------  DATE: 20      Primary Care Provider      Primary Care Provider:  ALYSSA MCGARRY            Referring Physician      Referring Provider:  ALYSSA MCGARRY            Chief Complaint      FU Anemia, Low Platelet Count            Subjective      65-year-old white male has been referred to our office for anemia and     thrombocytopenia.  Only pertinent finding on this patient was an elevated     homocystine level.  He had bone marrow core biopsy and aspiration performed in     2018 which showed a normocellular marrow for the patient's age with mild     relative erythroid hyperplasia.  There was no flow cytometric evidence of     monoclonality, acute leukemia plasma cell dyscrasia or lymphoproliferative     disorder.  Maturation is noted within the myeloid and erythroid series.  There     are adequate number of megakaryocytes seen.            CAT scan of his abdomen and pelvis showed mild splenomegaly.            Patient has been on iron initially because of iron deficiency as well as folic     acid.  Presently he is only taking folic acid 1 mg/day.  He does not take any     B12 or iron supplements.            Patient feels well.            Past Med/Surg History            Past Med/Surg History:   Hypertension             Diabetes Mellitus             Heart Disease             No Cancer             No Lung Disease             No Kidney Disease             No Other            Social History      Social History:  No Tobacco Use (quit in ), No Alcohol Use, No Recreational     Drug use, No Other            Allergies      Coded Allergies:             DOPAMINE (Verified  Allergy, Severe, CHEST PAIN, ST ELEVATION, 9/1/15)            Cultivated Oat Pollen (Verified  Allergy, Unknown, NASAL CONGESTION,     9/1/15)           GRASS POLLEN-ORCHARD GRASS,STD (Verified  Allergy, Unknown, NASAL     CONGESTION, 9/1/15)           OXYCODONE (Verified  Adverse Reaction, Severe, ALOT OF CONFUSION, 9/1/15)            Medications      Medications    Last Reconciled on 7/30/20 19:24 by LOU RAHMAN MD      Folic Acid (Folic Acid) 1 Mg Tablet      1 MG PO QDAY for 90 Days, #90 TAB 3 Refills         Prov: Christel Rahman         7/30/20       Ubidecarenone (Co Q-10) 200 Mg Cap      200 MG PO QDAY, CAP         Reported         12/19/19       Ergocalciferol (Ergocalciferol) 50,000 Unit Capsule      57012 UNITS PO SA@09, #4 CAP 0 Refills         Reported         12/19/19       Insulin Lispro (HumaLOG VIAL) 100 Units/Ml Vial      20 UNITS SUBQ BID INSULIN, #1 VIAL 0 Refills         Reported         1/17/19       Levothyroxine (Levothyroxine) 0.137 Mg Tablet      0.137 MG PO QDAY@07, #30 TAB 0 Refills         Reported         1/17/19       Rosuvastatin Calcium (Crestor*) 40 Mg Tablet      40 MG PO QDAY, #30 TAB 0 Refills         Reported         1/17/19       Apixaban (Eliquis) 5 Mg Tablet      5 MG PO BID for 30 Days, #60 TAB         Reported         1/17/19       dilTIAZem HCL (dilTIAZem HCL) 60 Mg Tab      60 MG PO BID, TAB         Reported         1/17/19       Magnesium Oxide (Magnesium Oxide*) 400 Mg Tablet      2 TAB PO BID, #60 TAB 0 Refills         Reported         1/17/19       Allopurinol (Zyloprim*) 100 Mg Tablet      100 MG PO BID for 30 Days, #60 TAB 0 Refills         Reported         4/26/18       Insulin Glargine (Lantus VIAL) 100 Units/Ml Vial      20 UNITS SUBQ BID INSULIN, #1 VIAL 0 Refills         Reported         4/26/18       Fenofibrate (Fenofibrate*) 160 Mg Tablet      160 MG PO QDAY, TAB         Reported         4/26/18       Atenolol (ATENOLOL) 25 Mg Tablet      25 MG PO QDAY, #30 TAB 0 Refills         Reported         4/26/18        Metoclopramide (Metoclopramide) 10 Mg Tablet      10 MG PO BID, TAB         Reported         4/26/18       Oxymetazoline Hcl (AFRIN) 15 Ml Spray      1 SPRAYS NARE EACH HS PRN for CONGESTION, #1 BOTTLE         Reported         4/26/18       Pantoprazole (Protonix) 40 Mg Tablet.dr      40 MG PO QDAY@07, #30 TAB 0 Refills         Reported         4/26/18      Current Medications      Current Medications Reviewed 7/30/20            Pain Assessment      Pain Intensity:  0      Description:  None            Review of Systems      General:  No Anxiety; Fatigue Scale: (0), Pain Scale: (0)      HEENT:  No Dysphagia, No Hearing Changes      Respiratory:  No Cough; Shortness of Air, Other (PT tested postitive for COVID19    in May 2020)      Cardiovascular:  No Chest Pain; Pedal Edema      Gastrointestinal:  Nausea, Vomiting, Diarrhea, Appetite Good (Good)      Genitourinary:  No Nocturia, No Dysuria      Musculoskeletal:  No Joint Effusions; Other (leg cramps)      Endocrine:  No Heat Intolerance, No Cold Intolerance      Hematologic:  No Bleeding      Allergic/Immunologic:  No Hives      Psychological:  No Anxiety, No Depression      Neurological:  No Headaches      Skin:  No Rash, No Open Wounds            Vitals      Height 6 ft 0 in / 182.88 cm      Weight 254 lbs 9.6 oz / 115.970852 kg      BSA 2.36 m2      BMI 34.5 kg/m2      Temperature 97.0 F / 36.11 C      Pulse 76      Respirations 20      Blood Pressure 122/58      Pulse Oximetry 94%            Exam      Constitutional:  No acute distress, Conversant, Pleasant      Eyes:  Anicteric sclerae, Palpebral Conjunctivae (Pink), YINA      Neck:  Supple      Cardiovascular:  RRR; No Murmurs; Normal PMI; No Peripheral Edema      Lungs:  Clear to Ausculation, Normal Respiratory Effort      Abdomen:  Soft, NABS; No Masses, No Tenderness      Chest:  Other (Symmetrical and equal)      Lymphatic:  No Neck      Extremities:  No digital cyanosis, No digital ischemia, Normal  gait, Other (No     deformity)      Neurological:  Cranial Nerve II-XII (Intact); No Focal Sensory deficits      Psychological:  Appropriate affect, Appropriate mood, Intact judgement, Alert      Skin:  Other (No dermatoses)            Lab Results      CMP showed glucose of 219, chloride 111, BUN 31      Iron profile normal ferritin level 383      Homocystine level 14.9      White count 5.3, hemoglobin 12/hematocrit 34.4 normal, normocytic indicis     physical 126,000            Impression/Problem List            Chronic thrombocytopenia mild      Chronic normochromic normocytic anemia      Low-density lipoprotein disease undergoing plasmapheresis      Diabetes mellitus insulin-dependent      Hypertension      Coronary artery disease      Hypothyroidism      Obstructive sleep apnea      Splenomegaly etiology unknown      Elevated homocystine level      History of iron deficiency given iron IV initially.      Notes      New Medications      * Folic Acid 1 MG TABLET: 1 MG PO QDAY 90 Days #90      Changed Medications      * OXYMETAZOLINE HCL (AFRIN) 15 ML SPRAY: 1 SPRAYS NARE EACH HS PRN CONGESTION #1      * MAGNESIUM OXIDE (Magnesium Oxide*) 400 MG TABLET: 2 TAB PO BID #60            Plan      In rare instances allopurinol may cause anemia and thrombocytopenia.      Refilled folic acid      Patient was advised to get in touch with his endocrinologist because of his     lipemic serum.      Patient will be followed up pain is primary care provider's office with CBC     every 6 months.  If any significant changes patient is to be referred back.            Patient Education:        Anemia            PREVENTION      Hx Influenza Vaccination:  Yes      Influenza Vaccine Declined:  No      2 or More Falls in Past Year?:  No      Fall Past Year with Injury?:  No      Encouraged to follow-up with:  PCP regarding preventative exams.      Chart initiated by      CARLA Caceres MA            Electronically signed by Christel Rahman   07/30/2020 19:24       Disclaimer: Converted document may not contain table formatting or lab diagrams. Please see [employer] for the authenticated document.

## 2021-11-14 PROBLEM — I25.10 CORONARY ARTERY DISEASE INVOLVING NATIVE CORONARY ARTERY OF NATIVE HEART WITHOUT ANGINA PECTORIS: Status: ACTIVE | Noted: 2021-11-14

## 2021-11-14 PROBLEM — Z95.1 HX OF CABG: Status: ACTIVE | Noted: 2021-11-14

## 2021-11-14 PROBLEM — I48.0 PAROXYSMAL ATRIAL FIBRILLATION: Status: ACTIVE | Noted: 2021-11-14

## 2021-11-14 NOTE — PROGRESS NOTES
[facility]  Cardiology progress Note    Patient Name: Beltran Ambrose  : 1955    CHIEF COMPLAINT  Coronary artery disease, s/p CABG. syncope      Subjective   Subjective     HISTORY OF PRESENT ILLNESS    Beltran Ambrose is a 66 y.o. male history of coronary disease s/p CABG.  No chest pain or shortness of breath.  No palpitations.  Has had 2 episodes of syncope in the last couple of months which lasted a few seconds which is spontaneous.  Review of Systems:   Constitutional no fever,  no weight loss   Skin no rash   Otolaryngeal no difficulty swallowing   Cardiovascular See HPI   Pulmonary no cough, no sputum production   Gastrointestinal no constipation, no diarrhea   Genitourinary no dysuria, no hematuria   Hematologic no easy bruisability, no abnormal bleeding   Musculoskeletal no muscle pain   Neurologic no dizziness, no falls         Personal History     Social History:  reports that he has quit smoking. He has never used smokeless tobacco. He reports that he does not drink alcohol and does not use drugs.    Home Medications:  Current Outpatient Medications on File Prior to Visit   Medication Sig   • allopurinol (ZYLOPRIM) 100 MG tablet    • amitriptyline (ELAVIL) 50 MG tablet Take 50 mg by mouth Daily.   • atenolol (TENORMIN) 25 MG tablet Take 25 mg by mouth Daily.   • chlorthalidone (HYGROTON) 25 MG tablet    • Eliquis 5 MG tablet tablet Take 5 mg by mouth 2 (Two) Times a Day.   • ergocalciferol (ERGOCALCIFEROL) 1.25 MG (42545 UT) capsule Vitamin D2 1,250 mcg (50,000 unit) capsule   • fenofibrate 160 MG tablet    • HYDROcodone-acetaminophen (NORCO) 7.5-325 MG per tablet Take 1 tablet by mouth Every 12 (Twelve) Hours As Needed.   • Insulin Glargine (Lantus SoloStar) 100 UNIT/ML injection pen Lantus Solostar U-100 Insulin 100 unit/mL (3 mL) subcutaneous pen   • Insulin Lispro, 1 Unit Dial, (HumaLOG KwikPen) 100 UNIT/ML solution pen-injector Humalog KwikPen (U-100) Insulin 100 unit/mL  subcutaneous   • levothyroxine (SYNTHROID, LEVOTHROID) 150 MCG tablet levothyroxine 150 mcg tablet   • Magnesium 400 MG capsule magnesium   • Oxymetazoline HCl (AFRIN NASAL SPRAY NA) Afrin Sinus and Allergy   • pantoprazole (PROTONIX) 40 MG EC tablet    • rosuvastatin (CRESTOR) 40 MG tablet Take 40 mg by mouth Daily.   • [DISCONTINUED] nitroglycerin (NITROSTAT) 0.4 MG SL tablet Place 0.4 mg under the tongue Every 5 (Five) Minutes As Needed for Chest Pain. Take no more than 3 doses in 15 minutes.     No current facility-administered medications on file prior to visit.     Allergies:  Allergies   Allergen Reactions   • Other Mental Status Change     Dopamine, Erythromycin, Oxycontin- Mental status change       Objective    Objective       Vitals:   Heart Rate:  [64] 64  BP: (156)/(48) 156/48  Body mass index is 35.94 kg/m².     Physical Exam:   Constitutional: Awake, alert, No acute distress    Eyes: PERRLA, sclerae anicteric, no conjunctival injection   HENT: NCAT, mucous membranes moist   Neck: Supple, no thyromegaly, no lymphadenopathy, trachea midline   Respiratory: Clear to auscultation bilaterally, nonlabored respirations    Cardiovascular: RRR, no murmurs or rubs. Palpable pedal pulses bilaterally   Musculoskeletal: No bilateral ankle edema, no cyanosis to extremities   Psychiatric: Appropriate affect, cooperative   Neurologic: Oriented x 3, strength symmetric in all extremities, Cranial Nerves grossly intact to confrontation, speech clear   Skin: No rashes.    Result Review    Result Review:  I have personally reviewed the available results from  [x]  Laboratory  [x]  EKG  [x]  Cardiology  [x]  Medications  [x]  Old records  []  Other:   Procedures  No results found for: CHOL  Lab Results   Component Value Date    TRIG 1,723 (H) 07/01/2021    TRIG 1,619 (H) 01/06/2021    TRIG 682 (H) 07/04/2018     Lab Results   Component Value Date    HDL 22 (L) 07/01/2021    HDL 21 (L) 01/06/2021    HDL 13 (L) 07/04/2018      Lab Results   Component Value Date    LDL  07/01/2021     Unable to calc due to high Trig  Direct LDL ordered    LDL Unable to perform due to extremely elevated Trig 01/06/2021    LDL UNABLE TO CALCULATE 07/04/2018     Lab Results   Component Value Date    VLDL UNABLE TO CALCULATE 07/04/2018    VLDL UNABLE TO CALCULATE 07/03/2018         Impression/Plan:  1.  Paroxysmal atrial fibrillation: Continue Eliquis 5 mg twice daily for stroke prevention.  Able to tolerate Eliquis without any side effect such as bleeding.  Continue Cardizem and atenolol 25 mg once a day.  2.  Coronary artery s/p CABG: Stable.  Continue atenolol 25 mg once a day.  3.  Hyperlipidemia: Continue Crestor 40 mg once a day.  Continue fenofibrate 160 mg a day.  Lipid profile is reviewed.  Able to tolerate these medications without any side effects.  Extremely high triglycerides.  Chronic.  Managed by his endocrinologist.  4.  Syncope: He is being worked up for syncope by his primary care doctor.  24-hour Holter in progress.  Suggest 30-day event monitor if 24-hour Holter is negative.  Echocardiogram to evaluate left ventricular systolic function.        Edgar Sprague MD   11/16/21   09:26 EST

## 2021-11-16 ENCOUNTER — OFFICE VISIT (OUTPATIENT)
Dept: CARDIOLOGY | Facility: CLINIC | Age: 66
End: 2021-11-16

## 2021-11-16 VITALS
WEIGHT: 265 LBS | DIASTOLIC BLOOD PRESSURE: 48 MMHG | HEIGHT: 72 IN | BODY MASS INDEX: 35.89 KG/M2 | HEART RATE: 64 BPM | SYSTOLIC BLOOD PRESSURE: 156 MMHG

## 2021-11-16 DIAGNOSIS — I48.0 PAROXYSMAL ATRIAL FIBRILLATION (HCC): Primary | ICD-10-CM

## 2021-11-16 DIAGNOSIS — Z95.1 HX OF CABG: ICD-10-CM

## 2021-11-16 DIAGNOSIS — E78.2 HYPERLIPEMIA, MIXED: ICD-10-CM

## 2021-11-16 DIAGNOSIS — I25.10 CORONARY ARTERY DISEASE INVOLVING NATIVE CORONARY ARTERY OF NATIVE HEART WITHOUT ANGINA PECTORIS: ICD-10-CM

## 2021-11-16 DIAGNOSIS — R55 SYNCOPE, UNSPECIFIED SYNCOPE TYPE: ICD-10-CM

## 2021-11-16 PROCEDURE — 99214 OFFICE O/P EST MOD 30 MIN: CPT | Performed by: SPECIALIST

## 2021-11-16 RX ORDER — PANTOPRAZOLE SODIUM 40 MG/1
40 TABLET, DELAYED RELEASE ORAL DAILY
COMMUNITY
Start: 2021-10-15

## 2021-11-16 RX ORDER — ERGOCALCIFEROL 1.25 MG/1
50000 CAPSULE ORAL WEEKLY
COMMUNITY
Start: 2021-08-09

## 2021-11-16 RX ORDER — INSULIN GLARGINE 100 [IU]/ML
INJECTION, SOLUTION SUBCUTANEOUS DAILY
COMMUNITY
Start: 2021-10-25

## 2021-11-16 RX ORDER — INSULIN LISPRO 100 [IU]/ML
INJECTION, SOLUTION INTRAVENOUS; SUBCUTANEOUS DAILY
COMMUNITY
Start: 2021-10-25

## 2021-11-16 RX ORDER — AMITRIPTYLINE HYDROCHLORIDE 50 MG/1
50 TABLET, FILM COATED ORAL DAILY
COMMUNITY
Start: 2021-10-15

## 2021-11-16 RX ORDER — ALLOPURINOL 100 MG/1
TABLET ORAL DAILY
COMMUNITY
Start: 2021-11-09

## 2021-11-16 RX ORDER — HYDROCODONE BITARTRATE AND ACETAMINOPHEN 7.5; 325 MG/1; MG/1
1 TABLET ORAL EVERY 12 HOURS PRN
COMMUNITY
Start: 2021-11-09

## 2021-11-16 RX ORDER — APIXABAN 5 MG/1
5 TABLET, FILM COATED ORAL 2 TIMES DAILY
COMMUNITY
Start: 2021-10-30 | End: 2021-12-21

## 2021-11-16 RX ORDER — NITROGLYCERIN 0.4 MG/1
0.4 TABLET SUBLINGUAL
COMMUNITY
End: 2021-11-16 | Stop reason: SDUPTHER

## 2021-11-16 RX ORDER — CHLORTHALIDONE 25 MG/1
25 TABLET ORAL DAILY
COMMUNITY
Start: 2021-10-15

## 2021-11-16 RX ORDER — LEVOTHYROXINE SODIUM 0.15 MG/1
TABLET ORAL DAILY
COMMUNITY
Start: 2021-05-03

## 2021-11-16 RX ORDER — FENOFIBRATE 160 MG/1
TABLET ORAL DAILY
COMMUNITY
Start: 2021-09-09

## 2021-11-16 RX ORDER — ATENOLOL 25 MG/1
25 TABLET ORAL DAILY
COMMUNITY
Start: 2021-10-15 | End: 2021-12-06

## 2021-11-16 RX ORDER — ROSUVASTATIN CALCIUM 40 MG/1
40 TABLET, COATED ORAL DAILY
COMMUNITY
Start: 2021-09-09

## 2021-11-16 RX ORDER — NITROGLYCERIN 0.4 MG/1
0.4 TABLET SUBLINGUAL
Qty: 25 TABLET | Refills: 2 | Status: SHIPPED | OUTPATIENT
Start: 2021-11-16 | End: 2022-02-21

## 2021-12-06 RX ORDER — ATENOLOL 25 MG/1
TABLET ORAL
Qty: 90 TABLET | Refills: 3 | Status: SHIPPED | OUTPATIENT
Start: 2021-12-06 | End: 2022-10-27

## 2021-12-21 RX ORDER — APIXABAN 5 MG/1
TABLET, FILM COATED ORAL
Qty: 180 TABLET | Refills: 1 | Status: SHIPPED | OUTPATIENT
Start: 2021-12-21 | End: 2022-06-01

## 2022-01-31 RX ORDER — DILTIAZEM HYDROCHLORIDE 60 MG/1
60 CAPSULE, EXTENDED RELEASE ORAL 2 TIMES DAILY
Qty: 180 CAPSULE | Refills: 2 | Status: SHIPPED | OUTPATIENT
Start: 2022-01-31 | End: 2022-06-21 | Stop reason: SDUPTHER

## 2022-02-02 ENCOUNTER — TELEPHONE (OUTPATIENT)
Dept: CARDIOLOGY | Facility: CLINIC | Age: 67
End: 2022-02-02

## 2022-02-02 NOTE — TELEPHONE ENCOUNTER
----- Message from Edgar Sprague MD sent at 1/31/2022 10:52 AM EST -----  Notify pt echocardiogram shows normal heart function and no significant valve abnormality. Keep follow up as scheduled.

## 2022-02-21 RX ORDER — NITROGLYCERIN 0.4 MG/1
TABLET SUBLINGUAL
Qty: 75 TABLET | Refills: 4 | Status: SHIPPED | OUTPATIENT
Start: 2022-02-21 | End: 2023-01-24

## 2022-05-17 ENCOUNTER — TELEPHONE (OUTPATIENT)
Dept: CARDIOLOGY | Facility: CLINIC | Age: 67
End: 2022-05-17

## 2022-05-17 NOTE — TELEPHONE ENCOUNTER
Procedure: Teeth Extractions    Med Directive: Eliquis 3 days    PMH: afib, CAD, CABG prior to 10/2019, hyperlipidemia     Last Seen: 11/16/21

## 2022-06-01 RX ORDER — APIXABAN 5 MG/1
TABLET, FILM COATED ORAL
Qty: 180 TABLET | Refills: 3 | Status: SHIPPED | OUTPATIENT
Start: 2022-06-01

## 2022-06-01 NOTE — TELEPHONE ENCOUNTER
Rx Refill Note  Requested Prescriptions     Pending Prescriptions Disp Refills   • Eliquis 5 MG tablet tablet [Pharmacy Med Name: Eliquis 5 MG Oral Tablet] 180 tablet 3     Sig: TAKE 1 TABLET BY MOUTH  TWICE DAILY      Last office visit with prescribing clinician: 11/16/2021      Next office visit with prescribing clinician: 6/21/2022            Christie Monsivais  06/01/22, 10:19 EDT    Matches last OV note.

## 2022-06-19 NOTE — PROGRESS NOTES
[facility]  Cardiology progress Note    Patient Name: Beltran Ambrose  : 1955    CHIEF COMPLAINT  Atrial fibrillation, coronary artery disease      Subjective   Subjective     HISTORY OF PRESENT ILLNESS    Beltran Ambrose is a 67 y.o. male with history of paroxysmal fibrillation CORONARY ARTERY DISEASE.  No chest pain or shortness of breath.    Review of Systems:   Constitutional no fever,  no weight loss   Skin no rash   Otolaryngeal no difficulty swallowing   Cardiovascular See HPI   Pulmonary no cough, no sputum production   Gastrointestinal no constipation, no diarrhea   Genitourinary no dysuria, no hematuria   Hematologic no easy bruisability, no abnormal bleeding   Musculoskeletal no muscle pain   Neurologic no dizziness, no falls         Personal History     Social History:  reports that he has quit smoking. He has never used smokeless tobacco. He reports that he does not drink alcohol and does not use drugs.    Home Medications:  Current Outpatient Medications on File Prior to Visit   Medication Sig   • allopurinol (ZYLOPRIM) 100 MG tablet Take  by mouth Daily.   • amitriptyline (ELAVIL) 50 MG tablet Take 50 mg by mouth Daily.   • atenolol (TENORMIN) 25 MG tablet TAKE 1 TABLET BY MOUTH  DAILY   • chlorthalidone (HYGROTON) 25 MG tablet Take 25 mg by mouth Daily.   • Eliquis 5 MG tablet tablet TAKE 1 TABLET BY MOUTH  TWICE DAILY   • empagliflozin (Jardiance) 25 MG tablet tablet Take 25 mg by mouth Daily.   • ergocalciferol (ERGOCALCIFEROL) 1.25 MG (72098 UT) capsule Take 50,000 Units by mouth 1 (One) Time Per Week.   • fenofibrate 160 MG tablet Take  by mouth Daily.   • HYDROcodone-acetaminophen (NORCO) 7.5-325 MG per tablet Take 1 tablet by mouth Every 12 (Twelve) Hours As Needed.   • Insulin Glargine (Lantus SoloStar) 100 UNIT/ML injection pen Daily.   • Insulin Lispro, 1 Unit Dial, (HUMALOG) 100 UNIT/ML solution pen-injector Daily.   • levothyroxine (SYNTHROID, LEVOTHROID) 150 MCG tablet  Daily.   • Magnesium 400 MG capsule Daily.   • nitroglycerin (NITROSTAT) 0.4 MG SL tablet DISSOLVE 1 TABLET UNDER THE TONGUE EVERY 5 MINUTES AS  NEEDED FOR CHEST PAIN. MAX  OF 3 TABLETS IN 15 MINUTES. CALL 911 IF PAIN PERSISTS.   • Oxymetazoline HCl (AFRIN NASAL SPRAY NA) by Left Nare route Daily.   • pantoprazole (PROTONIX) 40 MG EC tablet Take 40 mg by mouth Daily.   • rosuvastatin (CRESTOR) 40 MG tablet Take 40 mg by mouth Daily.   • [DISCONTINUED] dilTIAZem SR (CARDIZEM SR) 60 MG 12 hr capsule Take 1 capsule by mouth 2 (Two) Times a Day.     No current facility-administered medications on file prior to visit.     Allergies:  Allergies   Allergen Reactions   • Cetirizine Other (See Comments) and Rash   • Oxycodone Hcl Dizziness   • Erythromycin Nausea And Vomiting   • Gabapentin Other (See Comments)     Weakness and jitters  Weakness and jitters     • Other Mental Status Change     Dopamine, Erythromycin, Oxycontin- Mental status change       Objective    Objective       Vitals:   Heart Rate:  [64] 64  BP: (133)/(54) 133/54  Body mass index is 34.86 kg/m².     Physical Exam:   Constitutional: Awake, alert, No acute distress    Eyes: PERRLA, sclerae anicteric, no conjunctival injection   HENT: NCAT, mucous membranes moist   Neck: Supple, no thyromegaly, no lymphadenopathy, trachea midline   Respiratory: Clear to auscultation bilaterally, nonlabored respirations    Cardiovascular: RRR, no murmurs or rubs. Palpable pedal pulses bilaterally   Musculoskeletal: No bilateral ankle edema, no cyanosis to extremities   Psychiatric: Appropriate affect, cooperative   Neurologic: Oriented x 3, strength symmetric in all extremities, Cranial Nerves grossly intact to confrontation, speech clear   Skin: No rashes.    Result Review    Result Review:  I have personally reviewed the available results from  [x]  Laboratory  [x]  EKG  [x]  Cardiology  [x]  Medications  [x]  Old records  []  Other:   Procedures  No results found for:  CHOL  Lab Results   Component Value Date    TRIG 1,619 (H) 02/25/2022    TRIG 1,723 (H) 07/01/2021    TRIG 1,619 (H) 01/06/2021     Lab Results   Component Value Date    HDL 20 (L) 02/25/2022    HDL 22 (L) 07/01/2021    HDL 21 (L) 01/06/2021     Lab Results   Component Value Date    LDL Unable to perform due to extremely elevated Trig 02/25/2022    LDL  02/25/2022     Unable to calc due to high Trig  Direct LDL ordered    LDL Unable to perform due to extremely elevated Trig 07/01/2021    LDL  07/01/2021     Unable to calc due to high Trig  Direct LDL ordered     Lab Results   Component Value Date    VLDL UNABLE TO CALCULATE 07/04/2018    VLDL UNABLE TO CALCULATE 07/03/2018     Results for orders placed in visit on 01/28/22    Adult Transthoracic Echo Complete W/ Cont if Necessary Per Protocol    Interpretation Summary  Fibrocalcific mitral and aortic valves.  Mild aortic regurgitation.  Trace MR and trace TR.  Dilated left ventricle with normal left ventricular systolic function.     Impression/Plan:  1.  Paroxysmal atrial fibrillation: Continue Eliquis 5 mg twice daily for stroke prevention.  Able to tolerate Eliquis without any side effect such as bleeding.  Continue Cardizem and atenolol 25 mg once a day.  2.  Coronary artery s/p CABG: Stable.  Continue atenolol 25 mg once a day.  3.  Hyperlipidemia: Continue Crestor 40 mg once a day.  Continue fenofibrate 160 mg a day.  Lipid profile is reviewed.  Able to tolerate these medications without any side effects.  Extremely high triglycerides.  Chronic.  Managed by his endocrinologist.  4.  Syncope: Follow Holter showed no significant arrhythmias.  No further episodes.           Edgar Sprague MD   06/21/22   10:49 EDT

## 2022-06-21 ENCOUNTER — OFFICE VISIT (OUTPATIENT)
Dept: CARDIOLOGY | Facility: CLINIC | Age: 67
End: 2022-06-21

## 2022-06-21 VITALS
HEIGHT: 72 IN | DIASTOLIC BLOOD PRESSURE: 54 MMHG | BODY MASS INDEX: 34.81 KG/M2 | HEART RATE: 64 BPM | SYSTOLIC BLOOD PRESSURE: 133 MMHG | WEIGHT: 257 LBS

## 2022-06-21 DIAGNOSIS — I25.10 CORONARY ARTERY DISEASE INVOLVING NATIVE CORONARY ARTERY OF NATIVE HEART WITHOUT ANGINA PECTORIS: ICD-10-CM

## 2022-06-21 DIAGNOSIS — I48.0 PAROXYSMAL ATRIAL FIBRILLATION: Primary | ICD-10-CM

## 2022-06-21 DIAGNOSIS — Z95.1 HX OF CABG: ICD-10-CM

## 2022-06-21 PROCEDURE — 99214 OFFICE O/P EST MOD 30 MIN: CPT | Performed by: SPECIALIST

## 2022-06-21 RX ORDER — DILTIAZEM HYDROCHLORIDE 60 MG/1
60 CAPSULE, EXTENDED RELEASE ORAL 2 TIMES DAILY
Qty: 180 CAPSULE | Refills: 2 | Status: SHIPPED | OUTPATIENT
Start: 2022-06-21 | End: 2023-01-24 | Stop reason: SDUPTHER

## 2022-10-27 RX ORDER — ATENOLOL 25 MG/1
TABLET ORAL
Qty: 90 TABLET | Refills: 1 | Status: SHIPPED | OUTPATIENT
Start: 2022-10-27

## 2022-11-01 ENCOUNTER — TELEPHONE (OUTPATIENT)
Dept: CARDIOLOGY | Facility: CLINIC | Age: 67
End: 2022-11-01

## 2022-11-01 NOTE — TELEPHONE ENCOUNTER
Procedure: L trapeziectomy and suspensionplasty    Med Directive: Eliquis    PMH: afib, CAD, CABG prior to 10/2019, hyperlipidemia     Last Seen: 6/21/22

## 2023-01-22 NOTE — PROGRESS NOTES
[facility]  Cardiology progress Note    Patient Name: Beltran Ambrose  : 1955    CHIEF COMPLAINT  CORONARY ARTERY DISEASE        Subjective   Subjective     HISTORY OF PRESENT ILLNESS    Beltran Ambrose is a 67 y.o. male with coronary sisters post CABG.  No chest pain or shortness of breath.    REVIEW OF SYSTEMS    Constitutional:    No fever, no weight loss  Skin:     No rash  Otolaryngeal:    No difficulty swallowing  Cardiovascular: See HPI.  Pulmonary:    No cough, no sputum production    Personal History     Social History:    reports that he has quit smoking. He has never used smokeless tobacco. He reports that he does not drink alcohol and does not use drugs.    Home Medications:  Current Outpatient Medications on File Prior to Visit   Medication Sig   • allopurinol (ZYLOPRIM) 100 MG tablet Take  by mouth Daily.   • amitriptyline (ELAVIL) 50 MG tablet Take 50 mg by mouth Daily.   • atenolol (TENORMIN) 25 MG tablet TAKE 1 TABLET BY MOUTH  DAILY   • chlorthalidone (HYGROTON) 25 MG tablet Take 25 mg by mouth Daily.   • dapagliflozin Propanediol (Farxiga) 10 MG tablet Farxiga 10 mg tablet   • Diclofenac Sodium (VOLTAREN) 1 % gel gel    • Eliquis 5 MG tablet tablet TAKE 1 TABLET BY MOUTH  TWICE DAILY   • ergocalciferol (ERGOCALCIFEROL) 1.25 MG (33744 UT) capsule Take 50,000 Units by mouth 1 (One) Time Per Week.   • fenofibrate 160 MG tablet Take  by mouth Daily.   • Finerenone 10 MG tablet Take 1 tablet by mouth.   • HYDROcodone-acetaminophen (NORCO) 7.5-325 MG per tablet Take 1 tablet by mouth Every 12 (Twelve) Hours As Needed.   • icosapent ethyl (VASCEPA) 1 g capsule capsule 2 g 2 (Two) Times a Day With Meals.   • Insulin Glargine (Lantus SoloStar) 100 UNIT/ML injection pen Daily.   • Insulin Lispro, 1 Unit Dial, (HUMALOG) 100 UNIT/ML solution pen-injector Daily.   • levothyroxine (SYNTHROID, LEVOTHROID) 150 MCG tablet Daily.   • Magnesium 400 MG capsule Daily.   • Oxymetazoline HCl (AFRIN  NASAL SPRAY NA) by Left Nare route Daily.   • pantoprazole (PROTONIX) 40 MG EC tablet Take 40 mg by mouth Daily.   • rosuvastatin (CRESTOR) 40 MG tablet Take 40 mg by mouth Daily.   • [DISCONTINUED] dilTIAZem SR (CARDIZEM SR) 60 MG 12 hr capsule Take 1 capsule by mouth 2 (Two) Times a Day.   • [DISCONTINUED] losartan (COZAAR) 25 MG tablet    • [DISCONTINUED] empagliflozin (Jardiance) 25 MG tablet tablet Take 25 mg by mouth Daily.   • [DISCONTINUED] nitroglycerin (NITROSTAT) 0.4 MG SL tablet DISSOLVE 1 TABLET UNDER THE TONGUE EVERY 5 MINUTES AS  NEEDED FOR CHEST PAIN. MAX  OF 3 TABLETS IN 15 MINUTES. CALL 911 IF PAIN PERSISTS.     No current facility-administered medications on file prior to visit.       Past Medical History:   Diagnosis Date   • Atrial fibrillation (HCC)    • CAD (coronary artery disease)    • Myocardial infarction (HCC)        Allergies:  Allergies   Allergen Reactions   • Cetirizine Other (See Comments) and Rash   • Dopamine Other (See Comments)     hypotension  Action Taken: drops BP;   hypotension     • Oxycodone Hcl Dizziness   • Erythromycin Nausea And Vomiting   • Gabapentin Other (See Comments)     Weakness and jitters  Weakness and jitters     • Other Mental Status Change     Dopamine, Erythromycin, Oxycontin- Mental status change       Objective    Objective       Vitals:   Heart Rate:  [60] 60  BP: (151-152)/(62-63) 152/62  Body mass index is 35.26 kg/m².     PHYSICAL EXAM:    General Appearance:   · well developed  · well nourished  HENT:   · oropharynx moist  · lips not cyanotic  Neck:  · thyroid not enlarged  · supple  Respiratory:  · no respiratory distress  · normal breath sounds  · no rales  Cardiovascular:  · no jugular venous distention  · regular rhythm  · apical impulse normal  · S1 normal, S2 normal  · no S3, no S4   · no murmur  · no rub, no thrill  · carotid pulses normal; no bruit  · pedal pulses normal  · lower extremity edema: none    Skin:   · warm,  dry  Psychiatric:  · judgement and insight appropriate  · normal mood and affect        Result Review:  I have personally reviewed the available results from  [x]  Laboratory  [x]  EKG  [x]  Cardiology  [x]  Medications  [x]  Old records  []  Other:     Procedures  Results for orders placed in visit on 01/28/22    Adult Transthoracic Echo Complete W/ Cont if Necessary Per Protocol    Interpretation Summary  Fibrocalcific mitral and aortic valves.  Mild aortic regurgitation.  Trace MR and trace TR.  Dilated left ventricle with normal left ventricular systolic function.     Impression/Plan:  1.  Paroxysmal atrial fibrillation : Continue Eliquis 5 mg twice a day for stroke prevention.  Continue Cardizem CD 60 mg twice a day.  Continue atenolol 25 mg a day.  2.  Coronary s/p CABG stable: Continue atenolol 25 mg a day.  No chest pain.  3.  Hyperlipidemia: Continue Crestor 40 mg a day.  Continue fenofibrate 160 mg a day.  Monitor lipid and hepatic profile.  Superhigh hypertriglyceridemia managed by his endocrinologist.  4.  Essential hypertension uncontrolled: Continue atenolol 25 mg a day.  Increase losartan to 50 mg a day.  Monitor blood pressure regularly.        Edgar Sprague MD   01/24/23   10:48 EST

## 2023-01-24 ENCOUNTER — OFFICE VISIT (OUTPATIENT)
Dept: CARDIOLOGY | Facility: CLINIC | Age: 68
End: 2023-01-24
Payer: MEDICARE

## 2023-01-24 VITALS
BODY MASS INDEX: 35.21 KG/M2 | WEIGHT: 260 LBS | HEIGHT: 72 IN | DIASTOLIC BLOOD PRESSURE: 62 MMHG | SYSTOLIC BLOOD PRESSURE: 152 MMHG | HEART RATE: 60 BPM

## 2023-01-24 DIAGNOSIS — Z95.1 HX OF CABG: ICD-10-CM

## 2023-01-24 DIAGNOSIS — I25.10 CORONARY ARTERY DISEASE INVOLVING NATIVE CORONARY ARTERY OF NATIVE HEART WITHOUT ANGINA PECTORIS: Primary | ICD-10-CM

## 2023-01-24 DIAGNOSIS — I48.0 PAROXYSMAL ATRIAL FIBRILLATION: ICD-10-CM

## 2023-01-24 PROCEDURE — 99214 OFFICE O/P EST MOD 30 MIN: CPT | Performed by: SPECIALIST

## 2023-01-24 RX ORDER — DILTIAZEM HYDROCHLORIDE 60 MG/1
60 CAPSULE, EXTENDED RELEASE ORAL 2 TIMES DAILY
Qty: 180 CAPSULE | Refills: 3 | Status: SHIPPED | OUTPATIENT
Start: 2023-01-24

## 2023-01-24 RX ORDER — LOSARTAN POTASSIUM 50 MG/1
50 TABLET ORAL DAILY
Qty: 90 TABLET | Refills: 6 | Status: SHIPPED | OUTPATIENT
Start: 2023-01-24

## 2023-01-24 RX ORDER — DAPAGLIFLOZIN 10 MG/1
TABLET, FILM COATED ORAL
COMMUNITY

## 2023-01-24 RX ORDER — NITROGLYCERIN 0.4 MG/1
TABLET SUBLINGUAL
Qty: 100 TABLET | Refills: 3 | Status: SHIPPED | OUTPATIENT
Start: 2023-01-24

## 2023-01-24 RX ORDER — LOSARTAN POTASSIUM 25 MG/1
TABLET ORAL
COMMUNITY
Start: 2023-01-23 | End: 2023-01-24 | Stop reason: SDUPTHER

## 2023-01-24 RX ORDER — ICOSAPENT ETHYL 1000 MG/1
2 CAPSULE ORAL 2 TIMES DAILY WITH MEALS
COMMUNITY
Start: 2023-01-23

## 2023-04-10 RX ORDER — ATENOLOL 25 MG/1
TABLET ORAL
Qty: 90 TABLET | Refills: 3 | Status: SHIPPED | OUTPATIENT
Start: 2023-04-10

## 2023-05-04 RX ORDER — APIXABAN 5 MG/1
TABLET, FILM COATED ORAL
Qty: 180 TABLET | Refills: 3 | Status: SHIPPED | OUTPATIENT
Start: 2023-05-04

## 2023-05-18 ENCOUNTER — HOSPITAL ENCOUNTER (INPATIENT)
Facility: HOSPITAL | Age: 68
LOS: 1 days | Discharge: HOME | End: 2023-05-19
Attending: INTERNAL MEDICINE | Admitting: INTERNAL MEDICINE
Payer: MEDICARE

## 2023-05-18 PROBLEM — E87.5 HYPERKALEMIA: Status: ACTIVE | Noted: 2023-05-18

## 2023-05-18 LAB
ANION GAP SERPL CALCULATED.3IONS-SCNC: 12.7 MMOL/L (ref 5–15)
ARTERIAL PATENCY WRIST A: ABNORMAL
BASE EXCESS BLDA CALC-SCNC: -5.4 MMOL/L (ref -2–2)
BASOPHILS # BLD AUTO: 0.02 10*3/MM3 (ref 0–0.2)
BASOPHILS NFR BLD AUTO: 0.5 % (ref 0–1.5)
BDY SITE: ABNORMAL
BUN SERPL-MCNC: 59 MG/DL (ref 8–23)
BUN/CREAT SERPL: 21.2 (ref 7–25)
CA-I BLDA-SCNC: 1.2 MMOL/L (ref 1.13–1.32)
CALCIUM SPEC-SCNC: 9.8 MG/DL (ref 8.6–10.5)
CHLORIDE BLDA-SCNC: 110 MMOL/L (ref 98–106)
CHLORIDE SERPL-SCNC: 108 MMOL/L (ref 98–107)
CO2 SERPL-SCNC: 18.3 MMOL/L (ref 22–29)
COHGB MFR BLD: 0.6 % (ref 0–1.5)
CREAT SERPL-MCNC: 2.78 MG/DL (ref 0.76–1.27)
DEPRECATED RDW RBC AUTO: 48.8 FL (ref 37–54)
EGFRCR SERPLBLD CKD-EPI 2021: 24.2 ML/MIN/1.73
EOSINOPHIL # BLD AUTO: 0.06 10*3/MM3 (ref 0–0.4)
EOSINOPHIL NFR BLD AUTO: 1.4 % (ref 0.3–6.2)
ERYTHROCYTE [DISTWIDTH] IN BLOOD BY AUTOMATED COUNT: 16.3 % (ref 12.3–15.4)
FHHB: 4.5 % (ref 0–5)
GAS FLOW AIRWAY: ABNORMAL L/MIN
GLUCOSE BLDA-MCNC: 194 MG/DL (ref 70–99)
GLUCOSE BLDC GLUCOMTR-MCNC: 168 MG/DL (ref 70–99)
GLUCOSE SERPL-MCNC: 188 MG/DL (ref 65–99)
HCO3 BLDA-SCNC: 18.9 MMOL/L (ref 22–26)
HCT VFR BLD AUTO: 37.1 % (ref 37.5–51)
HGB BLD-MCNC: 12.6 G/DL (ref 13–17.7)
HGB BLDA-MCNC: 12.8 G/DL (ref 13.8–16.4)
IMM GRANULOCYTES # BLD AUTO: 0.02 10*3/MM3 (ref 0–0.05)
IMM GRANULOCYTES NFR BLD AUTO: 0.5 % (ref 0–0.5)
INHALED O2 CONCENTRATION: 21 %
LACTATE BLDA-SCNC: 0.99 MMOL/L (ref 0.5–2)
LYMPHOCYTES # BLD AUTO: 1.1 10*3/MM3 (ref 0.7–3.1)
LYMPHOCYTES NFR BLD AUTO: 26.1 % (ref 19.6–45.3)
MAGNESIUM SERPL-MCNC: 1.7 MG/DL (ref 1.6–2.4)
MCH RBC QN AUTO: 28 PG (ref 26.6–33)
MCHC RBC AUTO-ENTMCNC: 34 G/DL (ref 31.5–35.7)
MCV RBC AUTO: 82.4 FL (ref 79–97)
METHGB BLD QL: 0.3 % (ref 0–1.5)
MODALITY: ABNORMAL
MONOCYTES # BLD AUTO: 0.29 10*3/MM3 (ref 0.1–0.9)
MONOCYTES NFR BLD AUTO: 6.9 % (ref 5–12)
NEUTROPHILS NFR BLD AUTO: 2.72 10*3/MM3 (ref 1.7–7)
NEUTROPHILS NFR BLD AUTO: 64.6 % (ref 42.7–76)
NOTE: ABNORMAL
NRBC BLD AUTO-RTO: 0 /100 WBC (ref 0–0.2)
OXYHGB MFR BLDV: 94.6 % (ref 94–99)
PCO2 BLDA: 32.9 MM HG (ref 35–45)
PH BLDA: 7.38 PH UNITS (ref 7.35–7.45)
PLATELET # BLD AUTO: 91 10*3/MM3 (ref 140–450)
PMV BLD AUTO: 12.1 FL (ref 6–12)
PO2 BLD: 436 MM[HG] (ref 0–500)
PO2 BLDA: 91.5 MM HG (ref 80–100)
POTASSIUM BLDA-SCNC: 5.42 MMOL/L (ref 3.5–5)
POTASSIUM SERPL-SCNC: 5.4 MMOL/L (ref 3.5–5.2)
RBC # BLD AUTO: 4.5 10*6/MM3 (ref 4.14–5.8)
SAO2 % BLDCOA: 95.5 % (ref 95–99)
SODIUM BLDA-SCNC: 140.3 MMOL/L (ref 136–146)
SODIUM SERPL-SCNC: 139 MMOL/L (ref 136–145)
WBC NRBC COR # BLD: 4.21 10*3/MM3 (ref 3.4–10.8)

## 2023-05-18 PROCEDURE — 93005 ELECTROCARDIOGRAM TRACING: CPT | Performed by: INTERNAL MEDICINE

## 2023-05-18 PROCEDURE — 82948 REAGENT STRIP/BLOOD GLUCOSE: CPT

## 2023-05-18 PROCEDURE — 94799 UNLISTED PULMONARY SVC/PX: CPT

## 2023-05-18 PROCEDURE — 83050 HGB METHEMOGLOBIN QUAN: CPT | Performed by: INTERNAL MEDICINE

## 2023-05-18 PROCEDURE — 63710000001 INSULIN LISPRO (HUMAN) PER 5 UNITS: Performed by: INTERNAL MEDICINE

## 2023-05-18 PROCEDURE — 82805 BLOOD GASES W/O2 SATURATION: CPT | Performed by: INTERNAL MEDICINE

## 2023-05-18 PROCEDURE — 83735 ASSAY OF MAGNESIUM: CPT | Performed by: INTERNAL MEDICINE

## 2023-05-18 PROCEDURE — 99291 CRITICAL CARE FIRST HOUR: CPT | Performed by: INTERNAL MEDICINE

## 2023-05-18 PROCEDURE — 85025 COMPLETE CBC W/AUTO DIFF WBC: CPT | Performed by: INTERNAL MEDICINE

## 2023-05-18 PROCEDURE — 94660 CPAP INITIATION&MGMT: CPT

## 2023-05-18 PROCEDURE — 36600 WITHDRAWAL OF ARTERIAL BLOOD: CPT | Performed by: INTERNAL MEDICINE

## 2023-05-18 PROCEDURE — 82375 ASSAY CARBOXYHB QUANT: CPT | Performed by: INTERNAL MEDICINE

## 2023-05-18 PROCEDURE — 80048 BASIC METABOLIC PNL TOTAL CA: CPT | Performed by: INTERNAL MEDICINE

## 2023-05-18 PROCEDURE — 25010000002 FUROSEMIDE PER 20 MG: Performed by: INTERNAL MEDICINE

## 2023-05-18 RX ORDER — POLYETHYLENE GLYCOL 3350 17 G/17G
17 POWDER, FOR SOLUTION ORAL DAILY PRN
Status: DISCONTINUED | OUTPATIENT
Start: 2023-05-18 | End: 2023-05-19 | Stop reason: HOSPADM

## 2023-05-18 RX ORDER — DAPAGLIFLOZIN 10 MG/1
10 TABLET, FILM COATED ORAL DAILY
COMMUNITY

## 2023-05-18 RX ORDER — AMOXICILLIN 250 MG
2 CAPSULE ORAL 2 TIMES DAILY
Status: DISCONTINUED | OUTPATIENT
Start: 2023-05-18 | End: 2023-05-19 | Stop reason: HOSPADM

## 2023-05-18 RX ORDER — SODIUM CHLORIDE 0.9 % (FLUSH) 0.9 %
10 SYRINGE (ML) INJECTION AS NEEDED
Status: DISCONTINUED | OUTPATIENT
Start: 2023-05-18 | End: 2023-05-19 | Stop reason: HOSPADM

## 2023-05-18 RX ORDER — LEVOTHYROXINE SODIUM 175 UG/1
175 TABLET ORAL EVERY OTHER DAY
COMMUNITY
Start: 2023-03-20

## 2023-05-18 RX ORDER — SODIUM CHLORIDE 0.9 % (FLUSH) 0.9 %
10 SYRINGE (ML) INJECTION EVERY 12 HOURS SCHEDULED
Status: DISCONTINUED | OUTPATIENT
Start: 2023-05-18 | End: 2023-05-19 | Stop reason: HOSPADM

## 2023-05-18 RX ORDER — DEXTROSE MONOHYDRATE 25 G/50ML
25 INJECTION, SOLUTION INTRAVENOUS
Status: DISCONTINUED | OUTPATIENT
Start: 2023-05-18 | End: 2023-05-19 | Stop reason: HOSPADM

## 2023-05-18 RX ORDER — NICOTINE POLACRILEX 4 MG
15 LOZENGE BUCCAL
Status: DISCONTINUED | OUTPATIENT
Start: 2023-05-18 | End: 2023-05-19

## 2023-05-18 RX ORDER — BISACODYL 10 MG
10 SUPPOSITORY, RECTAL RECTAL DAILY PRN
Status: DISCONTINUED | OUTPATIENT
Start: 2023-05-18 | End: 2023-05-19 | Stop reason: HOSPADM

## 2023-05-18 RX ORDER — ONDANSETRON 4 MG/1
4 TABLET, FILM COATED ORAL EVERY 6 HOURS PRN
Status: DISCONTINUED | OUTPATIENT
Start: 2023-05-18 | End: 2023-05-19 | Stop reason: HOSPADM

## 2023-05-18 RX ORDER — SODIUM CHLORIDE 9 MG/ML
40 INJECTION, SOLUTION INTRAVENOUS AS NEEDED
Status: DISCONTINUED | OUTPATIENT
Start: 2023-05-18 | End: 2023-05-19 | Stop reason: HOSPADM

## 2023-05-18 RX ORDER — INSULIN LISPRO 100 [IU]/ML
2-9 INJECTION, SOLUTION INTRAVENOUS; SUBCUTANEOUS
Status: DISCONTINUED | OUTPATIENT
Start: 2023-05-18 | End: 2023-05-19 | Stop reason: HOSPADM

## 2023-05-18 RX ORDER — SODIUM BICARBONATE IN D5W 150/1000ML
150 PLASTIC BAG, INJECTION (ML) INTRAVENOUS CONTINUOUS
Status: DISCONTINUED | OUTPATIENT
Start: 2023-05-18 | End: 2023-05-19

## 2023-05-18 RX ORDER — ACETAMINOPHEN 325 MG/1
650 TABLET ORAL EVERY 4 HOURS PRN
Status: DISCONTINUED | OUTPATIENT
Start: 2023-05-18 | End: 2023-05-19 | Stop reason: HOSPADM

## 2023-05-18 RX ORDER — INSULIN GLARGINE 100 [IU]/ML
65 INJECTION, SOLUTION SUBCUTANEOUS NIGHTLY
COMMUNITY

## 2023-05-18 RX ORDER — BISACODYL 5 MG/1
5 TABLET, DELAYED RELEASE ORAL DAILY PRN
Status: DISCONTINUED | OUTPATIENT
Start: 2023-05-18 | End: 2023-05-19 | Stop reason: HOSPADM

## 2023-05-18 RX ORDER — METOCLOPRAMIDE 10 MG/1
10 TABLET ORAL DAILY
COMMUNITY
Start: 2023-04-07

## 2023-05-18 RX ORDER — SODIUM CHLORIDE, SODIUM LACTATE, POTASSIUM CHLORIDE, CALCIUM CHLORIDE 600; 310; 30; 20 MG/100ML; MG/100ML; MG/100ML; MG/100ML
75 INJECTION, SOLUTION INTRAVENOUS CONTINUOUS
Status: DISCONTINUED | OUTPATIENT
Start: 2023-05-18 | End: 2023-05-18

## 2023-05-18 RX ORDER — OXYMETAZOLINE HYDROCHLORIDE 0.05 G/100ML
2 SPRAY NASAL DAILY
COMMUNITY

## 2023-05-18 RX ORDER — PENICILLIN V POTASSIUM 500 MG/1
TABLET ORAL
COMMUNITY
Start: 2023-05-15

## 2023-05-18 RX ORDER — CALCIUM CARBONATE 500 MG/1
1 TABLET, CHEWABLE ORAL 2 TIMES DAILY PRN
Status: DISCONTINUED | OUTPATIENT
Start: 2023-05-18 | End: 2023-05-19 | Stop reason: HOSPADM

## 2023-05-18 RX ORDER — FUROSEMIDE 10 MG/ML
60 INJECTION INTRAMUSCULAR; INTRAVENOUS ONCE
Status: COMPLETED | OUTPATIENT
Start: 2023-05-18 | End: 2023-05-18

## 2023-05-18 RX ADMIN — APIXABAN 5 MG: 5 TABLET, FILM COATED ORAL at 20:31

## 2023-05-18 RX ADMIN — SODIUM CHLORIDE, POTASSIUM CHLORIDE, SODIUM LACTATE AND CALCIUM CHLORIDE 500 ML: 600; 310; 30; 20 INJECTION, SOLUTION INTRAVENOUS at 19:59

## 2023-05-18 RX ADMIN — FUROSEMIDE 60 MG: 10 INJECTION, SOLUTION INTRAMUSCULAR; INTRAVENOUS at 20:30

## 2023-05-18 RX ADMIN — SODIUM ZIRCONIUM CYCLOSILICATE 10 G: 10 POWDER, FOR SUSPENSION ORAL at 23:27

## 2023-05-18 RX ADMIN — INSULIN LISPRO 2 UNITS: 100 INJECTION, SOLUTION INTRAVENOUS; SUBCUTANEOUS at 19:34

## 2023-05-18 RX ADMIN — Medication 150 MEQ: at 20:31

## 2023-05-18 RX ADMIN — Medication 10 ML: at 20:41

## 2023-05-18 RX ADMIN — SODIUM ZIRCONIUM CYCLOSILICATE 10 G: 10 POWDER, FOR SUSPENSION ORAL at 20:31

## 2023-05-18 NOTE — CONSULTS
[facility]   Consult Note    Patient Name: Beltran Ambrose  : 1955  MRN: [id number]  Primary Care Physician:  Mauri Logan APRN  Referring Physician: No Known Provider  Date of admission: 2023    Subjective   Subjective     Reason for Consult/ Chief Complaint: Hyperkalemia    HPI:  Beltran Ambrose is a 68 y.o. male past medical history significant for DM2,HTN, coronary artery disease status post CABG and severe hypertriglyceridemia had a hyperviscosity syndrome in  where he had ATN requiring dialysis and AV fistula was also placed at that time which is functional.  Patient had another episode of ATN  when his creatinine was up to 6.1 but it came back to 1.7 range again.  His blood pressure is under good control and he has been doing really well.  He required plasmapheresis for hyperviscosity syndrome for severe hypertriglyceridemia triglycerides around 7710-1781 range and his last plasmapheresis was in  when he developed acute renal failure.  Patient has been feeling weak and somewhat dizzy over the last few days and then he was driving and suddenly he felt like that there is no strength left in his arms and legs and his wife was with him and then drove to local emergency room where labs were done and patient's potassium was 7.4 and patient also had acute renal failure and because of that reason patient was transferred to our facility.  Patient received emergent hyperkalemia treatment and apparently he was having bradycardia in other hospital but when he came here his heart rate was in 60s.    Review of Systems  Constitutional:        Weakness tiredness fatigue  Eyes:                       No blurry vision, eye discharge, eye irritation, eye pain  HEENT:                   No acute hair loss, earache and discharge, nasal congestion or discharge, sore throat, postnasal drip  Respiratory:           No shortness of breath coughing sputum production wheezing hemoptysis pleuritic chest  pain  Cardiovascular:     No chest pain, orthopnea, PND, dizziness, palpitation, lower extremity edema  Gastrointestinal:   No nausea vomiting diarrhea abdominal pain constipation  Genitourinary:       No urinary incontinence, hesitancy, frequency, urgency, dysuria  Neurological:        No confusion, headache, focal weakness, numbness, dysphasia  Hematologic:         No bruising, bleeding, pallor, lymphadenopathy  Endocrine:            No coldness, hot flashes, polyuria, abnormal hair growth  Musculoskeletal:  No body pains, aches, arthritic pains, muscle pain ,muscle wasting  Psychiatric:          No low or high mood, anxiety, hallucinations, delusions  Skin.                      No rash, ulcers, bruising, itching    Personal History     Past Medical History:   Diagnosis Date   • Atrial fibrillation    • CAD (coronary artery disease)    • Myocardial infarction        Past Surgical History:   Procedure Laterality Date   • CATARACT EXTRACTION     • CHOLECYSTECTOMY     • CORONARY ARTERY BYPASS GRAFT     • LEG SURGERY Right     Stents   • PANCREAS SURGERY         Family History: family history includes Heart attack in his paternal uncle; Heart disease in his father. Otherwise pertinent FHx was reviewed and not pertinent to current issue.    Social History:  reports that he has quit smoking. He has never used smokeless tobacco. He reports that he does not drink alcohol and does not use drugs.    Home Medications:  Diclofenac Sodium, HYDROcodone-acetaminophen, Insulin Glargine, Insulin Lispro (1 Unit Dial), allopurinol, amitriptyline, apixaban, atenolol, chlorthalidone, dapagliflozin Propanediol, dilTIAZem SR, ergocalciferol, fenofibrate, insulin glargine, levothyroxine, losartan, metoclopramide, nitroglycerin, oxymetazoline, pantoprazole, penicillin v potassium, and rosuvastatin    Allergies:  Allergies   Allergen Reactions   • Oxycodone Hcl Dizziness   • Erythromycin Nausea And Vomiting     NOT an allergy   •  Gabapentin Other (See Comments)     Weakness and jitters    NOT an allergy   • Cetirizine Rash   • Dopamine Other (See Comments)     hypotension  Action Taken: drops BP;   hypotension         Objective    Objective     Vitals:   Temp:  [97.7 °F (36.5 °C)-98.7 °F (37.1 °C)] 98.4 °F (36.9 °C)  Heart Rate:  [54-72] 55  Resp:  [14-25] 14  BP: (126-155)/(44-67) 155/53    Physical Exam:             Constitutional:         Awake, alert responsive, conversant, no obvious distress   Eyes:                       PERRLA, sclerae anicteric, no conjunctival injection   HEENT:                   Moist mucous membranes, no nasal or eye discharge, no throat congestion   Neck:                      Supple, no thyromegaly, no lymphadenopathy, trachea midline, no elevated JVD   Respiratory:           Clear to auscultation bilaterally, nonlabored respirations    Cardiovascular:     RRR, no murmurs, rubs, or gallops, palpable pedal pulses bilaterally, No bilateral ankle edema   Gastrointestinal:   Positive bowel sounds, soft, nontender, non-distended, no organomegaly   Musculoskeletal:  No clubbing or cyanosis to extremities, muscle wasting, joint swelling, muscle weakness   Psychiatric:              Appropriate affect, cooperative   Neurologic:            Awake alert, oriented x 3, strength symmetric in all extremities, Cranial Nerves grossly intact to confrontation, speech clear   Skin:                      No rashes, bruising, skin ulcers, petechiae or ecchymosis    Result Review    Result Review:  I have personally reviewed the results from the time of this admission to 5/19/2023 09:09 EDT and agree with these findings:  []  Laboratory  []  Microbiology  []  Radiology  []  EKG/Telemetry   []  Cardiology/Vascular   []  Pathology  []  Old records  []  Other:      Assessment & Plan   Assessment / Plan     Active Hospital Problems:  Active Hospital Problems    Diagnosis    • **Hyperkalemia    • Acute renal failure superimposed on chronic  kidney disease    • Metabolic acidosis    • Type 2 diabetes mellitus    • Paroxysmal atrial fibrillation    • Coronary artery disease involving native coronary artery of native heart without angina pectoris    Patient developed acute kidney injury and was on losartan and also I believe he was started on Kerendia as outpatient along with SGLT2 inhibitors and chlorthalidone.  He got volume depleted which caused acute renal failure and along with these medications he developed life-threatening hyperkalemia    Plan:   We will start patient on bicarb drip which will help to correct metabolic acidosis and shift potassium into the cell and also will induce diuresis  Once patient is fully volume repleted we will give him 1 dose of Lasix  Start patient on Lokelma  Monitor very closely for any EKG changes  With these measures I expect improvement in acute renal failure hyperkalemia and metabolic acidosis    Electronically signed by Angela St MD, 05/18/23, 6:07 PM EDT.

## 2023-05-18 NOTE — H&P
[facility]  Date: [facility]   Patient Name: Beltran Ambrose  : 1955  MRN: [id number]  Primary Care Physician:  Mauri Logan APRN  Date of admission: 2023    Subjective   Subjective     Chief Complaint:   Dizziness, tremor    HPI:    Beltran Ambrose is a 68 y.o. 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  Patient's wife at bedside stating for the past 2 weeks patient has been feeling dizzy, weak in the legs when standing.  Also having a tremor.  Today while driving the car patient's tremor acutely worsened, patient was unable to lift his leg to press on the brake pedal.  Wife helped him pull into the emergency [place] and then drove him to the hospital.  At [place] patient was found to be in A-fib with a heart rate between 39 and 43.  Blood pressure was stable at 120/50.  Patient was found to be hyperkalemic with a potassium of 7.7, bicarb of 17.  BUN is 60 and a creatinine of 2.9.  Patient follows with nephrology as an outpatient, however most recent creatinine 3/17/2023 was 1.6.  Patient was provided calcium gluconate, belies albuterol, insulin with dextrose, Lokelma and bicarb, requested a dose of Lasix prior to transfer.  Outside facility called patient's nephrologist, Dr. St, who agrees patient should be transferred and agrees to consult.  Patient admitted to the ICU given his hyperkalemia with resulting bradycardia.    On arrival patient is a heart rate of 65 and a blood pressure of 135/59.  Patient states he feels significantly better than when he walked into the emergency department at [facility].      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    Past Surgical History:  Partial pancreatectomy given chronic pancreatitis  Patient has a left upper extremity graft, given his history of plasmapheresis from hypertriglycerides  Cataract surgery  Cholecystectomy  CABG    Family History:   Several family members also had hypertriglyceridemia resulting in pancreatic damage  Diabetes    Social History:   Non-smoker  Nondrinker  Denies illicit drug use    Home Medications:  Diclofenac Sodium, HYDROcodone-acetaminophen, Insulin Glargine, Insulin Lispro (1 Unit Dial), allopurinol, amitriptyline, apixaban, atenolol, chlorthalidone, dapagliflozin Propanediol, dilTIAZem SR, ergocalciferol, fenofibrate, insulin glargine, levothyroxine, losartan, metoclopramide, nitroglycerin, oxymetazoline, pantoprazole, penicillin v potassium, and rosuvastatin    Allergies:  Allergies   Allergen Reactions   • Cetirizine Other (See Comments) and Rash   • Dopamine Other (See Comments)     hypotension  Action Taken: drops BP;   hypotension     • Oxycodone Hcl Dizziness   • Erythromycin Nausea And Vomiting   • Gabapentin Other (See Comments)     Weakness and jitters  Weakness and jitters     • Other Mental Status Change     Dopamine, Erythromycin, Oxycontin- Mental status change       Review of Systems   All systems were reviewed and negative except for: Resting comfortably, minimal weakness    Objective   Objective     Vitals:   Heart Rate:  [61-66] 65  BP: (135)/(59) 135/59    Physical Exam    Constitutional: Awake, alert, no acute distress   Eyes: Pupils equal, sclerae anicteric, no conjunctival injection   HENT: NCAT, mucous membranes dry   Neck: Supple, no thyromegaly, no lymphadenopathy, trachea midline   Respiratory: Clear to auscultation bilaterally, nonlabored respirations    Cardiovascular: RRR, no murmurs, rubs, or gallops, palpable pedal pulses  bilaterally   Gastrointestinal: Positive bowel sounds, soft, nontender, distended   Musculoskeletal: No bilateral ankle edema, no clubbing or cyanosis to extremities   Psychiatric: Appropriate affect, cooperative   Neurologic: Oriented x 3, strength symmetric in all extremities, Cranial Nerves grossly intact to confrontation, speech clear   Skin: No rashes, no tenting    Result Review    Result Review:  I have personally reviewed the results from the time of this admission to 5/18/2023 19:07 EDT and agree with these findings:  []  Laboratory  []  Microbiology  []  Radiology  []  EKG/Telemetry   []  Cardiology/Vascular   []  Pathology  []  Old records  []  Other:      Assessment & Plan   Assessment / Plan     Assessment/Plan:   Life-threatening hyperkalemia  ELSA on CKD  Metabolic acidosis  Atrial fibrillation on Eliquis  Diabetes    Plan:  Patient is admitted to the ICU for ongoing care management  Nephrology, intensivist consulted, appreciate assistance  Stat labs have been ordered, will follow patient's potassium through the night  Additional Lokelma and Lasix have been ordered  Bicarb drip was ordered by nephrology   Sliding scale insulin hypoglycemia protocol    DVT prophylaxis:  Medical DVT prophylaxis orders are present.    CODE STATUS:    Code Status (Patient has no pulse and is not breathing): CPR (Attempt to Resuscitate)  Medical Interventions (Patient has pulse or is breathing): Full Support      Admission Status:  I believe this patient meets inpatient status.    Electronically signed by Iggy Solares MD, 05/18/23, 6:01 PM EDT.    Patient is critically ill in ICU with life-threatening hyperkalemia, ELSA on CKD, metabolic acidosis, atrial fibrillation, diabetes.  I have spent 37 minutes of critical care time reviewing previous documentation, reviewing all pertinent telemetry, labs, and imaging studies, examining the patient, modifying the care plan and discussing the patient´s condition and care plan with any  available family and subspecialists

## 2023-05-18 NOTE — PLAN OF CARE
Goal Outcome Evaluation:      Pt arrived from [facility] at approx 17: via EMS; VSS throughout, pt HR of 65-70, SBP between 120-130's, on RA at 99%, and no c/o dizziness, shakiness, or weakness. ABG w/ lytes collected after arrival to unit. Pt reports to use CPAP at night.   Pt has had 0 s/s observed or reported since arrival.  Will continue to monitor.

## 2023-05-18 NOTE — PLAN OF CARE
Received call from [facility].  Patient presented to hospital for dizziness and tremor, question the symptoms of being present for 2 weeks however significantly worsened over the past 24 hours.  Labs reveal a potassium of 7.7, bicarb of 17.  Patient with a BUN of 60 and a creatinine of 2.9.  Patient is in A-fib with a heart rate 39-43.  Blood pressure of 120/50.  Patient was provided calcium gluconate, albuterol continuous nebulizers, insulin with glucose, Lokelma and bicarb.  Requested Lasix to be provided.  Patient with no history of renal failure.  Call was placed to nephrologist, Dr. St who agrees to see the patient in consultation.  Given patient's bradycardia and hyperkalemia will admit to the ICU      Electronically signed by Iggy Solares MD, 05/18/23, 2:11 PM EDT.

## 2023-05-19 ENCOUNTER — READMISSION MANAGEMENT (OUTPATIENT)
Dept: CALL CENTER | Facility: HOSPITAL | Age: 68
End: 2023-05-19
Payer: MEDICARE

## 2023-05-19 VITALS
DIASTOLIC BLOOD PRESSURE: 60 MMHG | BODY MASS INDEX: 35.24 KG/M2 | SYSTOLIC BLOOD PRESSURE: 130 MMHG | TEMPERATURE: 98.4 F | OXYGEN SATURATION: 95 % | WEIGHT: 260.14 LBS | RESPIRATION RATE: 19 BRPM | HEART RATE: 64 BPM | HEIGHT: 72 IN

## 2023-05-19 PROBLEM — N18.9 ACUTE RENAL FAILURE SUPERIMPOSED ON CHRONIC KIDNEY DISEASE: Status: ACTIVE | Noted: 2023-05-19

## 2023-05-19 PROBLEM — N17.9 ACUTE RENAL FAILURE SUPERIMPOSED ON CHRONIC KIDNEY DISEASE: Status: ACTIVE | Noted: 2023-05-19

## 2023-05-19 PROBLEM — E11.9 TYPE 2 DIABETES MELLITUS: Status: ACTIVE | Noted: 2023-05-19

## 2023-05-19 PROBLEM — E87.20 METABOLIC ACIDOSIS: Status: ACTIVE | Noted: 2023-05-19

## 2023-05-19 LAB
ANION GAP SERPL CALCULATED.3IONS-SCNC: 13.2 MMOL/L (ref 5–15)
BASOPHILS # BLD AUTO: 0.02 10*3/MM3 (ref 0–0.2)
BASOPHILS NFR BLD AUTO: 0.4 % (ref 0–1.5)
BUN SERPL-MCNC: 54 MG/DL (ref 8–23)
BUN/CREAT SERPL: 20.4 (ref 7–25)
CALCIUM SPEC-SCNC: 9.9 MG/DL (ref 8.6–10.5)
CHLORIDE SERPL-SCNC: 100 MMOL/L (ref 98–107)
CO2 SERPL-SCNC: 24.8 MMOL/L (ref 22–29)
CREAT SERPL-MCNC: 2.65 MG/DL (ref 0.76–1.27)
DEPRECATED RDW RBC AUTO: 47 FL (ref 37–54)
EGFRCR SERPLBLD CKD-EPI 2021: 25.6 ML/MIN/1.73
EOSINOPHIL # BLD AUTO: 0.07 10*3/MM3 (ref 0–0.4)
EOSINOPHIL NFR BLD AUTO: 1.4 % (ref 0.3–6.2)
ERYTHROCYTE [DISTWIDTH] IN BLOOD BY AUTOMATED COUNT: 15.9 % (ref 12.3–15.4)
GLUCOSE BLDC GLUCOMTR-MCNC: 268 MG/DL (ref 70–99)
GLUCOSE SERPL-MCNC: 305 MG/DL (ref 65–99)
HCT VFR BLD AUTO: 39.1 % (ref 37.5–51)
HGB BLD-MCNC: 12.9 G/DL (ref 13–17.7)
IMM GRANULOCYTES # BLD AUTO: 0.01 10*3/MM3 (ref 0–0.05)
IMM GRANULOCYTES NFR BLD AUTO: 0.2 % (ref 0–0.5)
LYMPHOCYTES # BLD AUTO: 1.35 10*3/MM3 (ref 0.7–3.1)
LYMPHOCYTES NFR BLD AUTO: 26.1 % (ref 19.6–45.3)
MAGNESIUM SERPL-MCNC: 1.5 MG/DL (ref 1.6–2.4)
MCH RBC QN AUTO: 27.2 PG (ref 26.6–33)
MCHC RBC AUTO-ENTMCNC: 33 G/DL (ref 31.5–35.7)
MCV RBC AUTO: 82.3 FL (ref 79–97)
MONOCYTES # BLD AUTO: 0.33 10*3/MM3 (ref 0.1–0.9)
MONOCYTES NFR BLD AUTO: 6.4 % (ref 5–12)
NEUTROPHILS NFR BLD AUTO: 3.4 10*3/MM3 (ref 1.7–7)
NEUTROPHILS NFR BLD AUTO: 65.5 % (ref 42.7–76)
NRBC BLD AUTO-RTO: 0 /100 WBC (ref 0–0.2)
PLATELET # BLD AUTO: 97 10*3/MM3 (ref 140–450)
PMV BLD AUTO: 12.6 FL (ref 6–12)
POTASSIUM SERPL-SCNC: 4.6 MMOL/L (ref 3.5–5.2)
RBC # BLD AUTO: 4.75 10*6/MM3 (ref 4.14–5.8)
SODIUM SERPL-SCNC: 138 MMOL/L (ref 136–145)
WBC NRBC COR # BLD: 5.18 10*3/MM3 (ref 3.4–10.8)

## 2023-05-19 PROCEDURE — 25010000002 MAGNESIUM SULFATE 2 GM/50ML SOLUTION: Performed by: PHYSICIAN ASSISTANT

## 2023-05-19 PROCEDURE — 82948 REAGENT STRIP/BLOOD GLUCOSE: CPT

## 2023-05-19 PROCEDURE — 80048 BASIC METABOLIC PNL TOTAL CA: CPT | Performed by: INTERNAL MEDICINE

## 2023-05-19 PROCEDURE — 94799 UNLISTED PULMONARY SVC/PX: CPT

## 2023-05-19 PROCEDURE — 83735 ASSAY OF MAGNESIUM: CPT | Performed by: INTERNAL MEDICINE

## 2023-05-19 PROCEDURE — 63710000001 INSULIN LISPRO (HUMAN) PER 5 UNITS: Performed by: INTERNAL MEDICINE

## 2023-05-19 PROCEDURE — 85025 COMPLETE CBC W/AUTO DIFF WBC: CPT | Performed by: INTERNAL MEDICINE

## 2023-05-19 PROCEDURE — 63710000001 INSULIN DETEMIR PER 5 UNITS: Performed by: INTERNAL MEDICINE

## 2023-05-19 PROCEDURE — 99239 HOSP IP/OBS DSCHRG MGMT >30: CPT | Performed by: INTERNAL MEDICINE

## 2023-05-19 RX ORDER — ALLOPURINOL 100 MG/1
100 TABLET ORAL 2 TIMES DAILY
Status: DISCONTINUED | OUTPATIENT
Start: 2023-05-19 | End: 2023-05-19 | Stop reason: HOSPADM

## 2023-05-19 RX ORDER — LEVOTHYROXINE SODIUM 0.15 MG/1
150 TABLET ORAL
Status: DISCONTINUED | OUTPATIENT
Start: 2023-05-19 | End: 2023-05-19 | Stop reason: HOSPADM

## 2023-05-19 RX ORDER — DEXTROSE MONOHYDRATE 25 G/50ML
25 INJECTION, SOLUTION INTRAVENOUS
Status: DISCONTINUED | OUTPATIENT
Start: 2023-05-19 | End: 2023-05-19 | Stop reason: HOSPADM

## 2023-05-19 RX ORDER — FENOFIBRATE 145 MG/1
145 TABLET, COATED ORAL DAILY
Status: DISCONTINUED | OUTPATIENT
Start: 2023-05-19 | End: 2023-05-19 | Stop reason: HOSPADM

## 2023-05-19 RX ORDER — DILTIAZEM HYDROCHLORIDE 120 MG/1
120 CAPSULE, COATED, EXTENDED RELEASE ORAL
Status: DISCONTINUED | OUTPATIENT
Start: 2023-05-19 | End: 2023-05-19 | Stop reason: HOSPADM

## 2023-05-19 RX ORDER — MAGNESIUM SULFATE HEPTAHYDRATE 40 MG/ML
2 INJECTION, SOLUTION INTRAVENOUS ONCE
Status: COMPLETED | OUTPATIENT
Start: 2023-05-19 | End: 2023-05-19

## 2023-05-19 RX ORDER — ROSUVASTATIN CALCIUM 20 MG/1
40 TABLET, COATED ORAL NIGHTLY
Status: DISCONTINUED | OUTPATIENT
Start: 2023-05-19 | End: 2023-05-19 | Stop reason: HOSPADM

## 2023-05-19 RX ORDER — SODIUM CHLORIDE 9 MG/ML
100 INJECTION, SOLUTION INTRAVENOUS CONTINUOUS
Status: DISCONTINUED | OUTPATIENT
Start: 2023-05-19 | End: 2023-05-19 | Stop reason: HOSPADM

## 2023-05-19 RX ORDER — NICOTINE POLACRILEX 4 MG
15 LOZENGE BUCCAL
Status: DISCONTINUED | OUTPATIENT
Start: 2023-05-19 | End: 2023-05-19 | Stop reason: HOSPADM

## 2023-05-19 RX ADMIN — FENOFIBRATE 145 MG: 145 TABLET, FILM COATED ORAL at 08:22

## 2023-05-19 RX ADMIN — MAGNESIUM SULFATE HEPTAHYDRATE 2 G: 2 INJECTION, SOLUTION INTRAVENOUS at 05:12

## 2023-05-19 RX ADMIN — Medication 150 MEQ: at 02:12

## 2023-05-19 RX ADMIN — INSULIN LISPRO 6 UNITS: 100 INJECTION, SOLUTION INTRAVENOUS; SUBCUTANEOUS at 08:21

## 2023-05-19 RX ADMIN — Medication 150 MEQ: at 07:16

## 2023-05-19 RX ADMIN — Medication 10 ML: at 08:23

## 2023-05-19 RX ADMIN — APIXABAN 5 MG: 5 TABLET, FILM COATED ORAL at 08:22

## 2023-05-19 RX ADMIN — LEVOTHYROXINE SODIUM 150 MCG: 0.15 TABLET ORAL at 06:36

## 2023-05-19 RX ADMIN — ALLOPURINOL 100 MG: 100 TABLET ORAL at 08:22

## 2023-05-19 RX ADMIN — DILTIAZEM HYDROCHLORIDE 120 MG: 120 CAPSULE, COATED, EXTENDED RELEASE ORAL at 08:22

## 2023-05-19 RX ADMIN — INSULIN DETEMIR 50 UNITS: 100 INJECTION, SOLUTION SUBCUTANEOUS at 08:22

## 2023-05-19 NOTE — PLAN OF CARE
Goal Outcome Evaluation:         Patient placed on BIPAP tonight because he wears a unit at home.

## 2023-05-19 NOTE — OUTREACH NOTE
Prep Survey    Flowsheet Row Responses   [facility] facility patient discharged from? [facility]   Is LACE score < 7 ? Yes   Eligibility Not Eligible   What are the reasons patient is not eligible? Other  [Low risk for readmission]   Does the patient have one of the following disease processes/diagnoses(primary or secondary)? Other   Prep survey completed? Yes          Shelly FLANAGAN - Registered Nurse

## 2023-05-19 NOTE — PROGRESS NOTES
[facility]     Progress Note    Patient Name: Beltran Ambrose  : 1955  MRN: [id number]  Primary Care Physician:  Mauri Logan APRN  Date of admission: 2023    Subjective   Patient's potassium is back to normal  He is feeling great today  Acute kidney injury improving  Review of Systems  Constitutional:        Weakness tiredness fatigue  Eyes:                       No blurry vision, eye discharge, eye irritation, eye pain  HEENT:                   No acute hair loss, earache and discharge, nasal congestion or discharge, sore throat, postnasal drip  Respiratory:           No shortness of breath coughing sputum production wheezing hemoptysis pleuritic chest pain  Cardiovascular:     No chest pain, orthopnea, PND, dizziness, palpitation, lower extremity edema  Gastrointestinal:   No nausea vomiting diarrhea abdominal pain constipation  Genitourinary:       No urinary incontinence, hesitancy, frequency, urgency, dysuria  Hematologic:         No bruising, bleeding, pallor, lymphadenopathy  Endocrine:            No coldness, hot flashes, polyuria, abnormal hair growth  Musculoskeletal:    No body pains, aches, arthritic pains, muscle pain ,muscle wasting  Psychiatric:          No low or high mood, anxiety, hallucinations, delusions  Skin.                      No rash, ulcers, bruising, itching  Neurological:        No confusion, headache, focal weakness, numbness, dysphasia    Objective   Objective     Vitals:   Temp:  [97.7 °F (36.5 °C)-98.7 °F (37.1 °C)] 98.4 °F (36.9 °C)  Heart Rate:  [54-72] 55  Resp:  [14-25] 14  BP: (126-155)/(44-67) 155/53  Physical Exam    Constitutional: Awake, alert responsive, conversant, no obvious distress              Psychiatric:  Appropriate affect, cooperative   Neurologic:  Awake alert ,oriented x 3, strength symmetric in all extremities, Cranial Nerves grossly intact to confrontation, speech clear   Eyes:   PERRLA, sclerae anicteric, no conjunctival  injection   HEENT:  Moist mucous membranes, no nasal or eye discharge, no throat congestion   Neck:   Supple, no thyromegaly, no lymphadenopathy, trachea midline, no elevated JVD   Respiratory:  Clear to auscultation bilaterally, nonlabored respirations    Cardiovascular: RRR, no murmurs, rubs, or gallops, palpable pedal pulses bilaterally, No bilateral ankle edema   Gastrointestinal: Positive bowel sounds, soft, nontender, nondistended, no organomegaly   Musculoskeletal:  No clubbing or cyanosis to extremities,muscle wasting, joint swelling, muscle weakness             Skin:                      No rashes, bruising, skin ulcers, petechiae or ecchymosis    Result Review    Result Review:  I have personally reviewed the results from the time of this admission to 5/19/2023 09:11 EDT and agree with these findings:  []  Laboratory  []  Microbiology  []  Radiology  []  EKG/Telemetry   []  Cardiology/Vascular   []  Pathology  []  Old records  []  Other:    Assessment & Plan   Assessment / Plan       Active Hospital Problems:    Active Hospital Problems    Diagnosis  POA   • **Hyperkalemia [E87.5]  Yes   • Acute renal failure superimposed on chronic kidney disease [N17.9, N18.9]  Unknown   • Metabolic acidosis [E87.20]  Unknown   • Type 2 diabetes mellitus [E11.9]  Unknown   • Paroxysmal atrial fibrillation [I48.0]  Yes   • Coronary artery disease involving native coronary artery of native heart without angina pectoris [I25.10]  Yes       Plan:   We can stop IV fluids  Patient is advised to hydrate really well  Stop chlorthalidone for the time being  If patient is taking Karendia, he needs to stop that also  Patient needs to do BMP Tuesday in [place] and then we will have a televisit with patient on Wednesday  I have also advised patient in future if he started feeling weak tired he must come to the hospital as he may be developing hyperkalemia    Electronically signed by Angela St MD, 05/19/23, 9:11 AM  EDT.

## 2023-05-19 NOTE — PLAN OF CARE
Goal Outcome Evaluation: pt admitted for hyperkalemia corrected this am cpap at hs. Blood glucose elevated takes lantus bid at home. Requesting his allopurinal he takes at home diureesed 2L plus. No other complaints

## 2023-05-19 NOTE — PLAN OF CARE
Goal Outcome Evaluation:      Patient wore cpap throughout the night.  Removed this morning to room air.  Patient's saturation is in the mid 90s.  Resting well at this time. No breakdown noted on the face.

## 2023-05-19 NOTE — DISCHARGE SUMMARY
[facility]         HOSPITALIST  DISCHARGE SUMMARY    Patient Name: Beltran Ambrose  : 1955  MRN: [id number]    Date of Admission: 2023  Date of Discharge:  2023  Primary Care Physician: Mauri Logan APRN    Consults     Date and Time Order Name Status Description    2023  5:44 PM Inpatient Pulmonology Consult      2023  5:44 PM Inpatient Nephrology Consult            Active and Resolved Hospital Problems:  Life-threatening hyperkalemia, treated  ELSA on CKD, improved  Metabolic acidosis, resolved  Atrial fibrillation on Eliquis  Insulin-dependent diabetes    Hospital Course     Hospital Course:  Beltran Ambrose is a 68 y.o. 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  Patient's wife at bedside stating for the past 2 weeks patient has been feeling dizzy, weak in the legs when standing.  Also having a tremor. At [place] patient was found to be in A-fib with a heart rate between 39 and 43.  Blood pressure was stable at 120/50.  Patient was found to be hyperkalemic with a potassium of 7.7, bicarb of 17.  BUN is 60 and a creatinine of 2.9.  Patient follows with nephrology as an outpatient, however most recent creatinine 3/17/2023 was 1.6.  Patient was provided calcium gluconate, belies albuterol, insulin with dextrose, Lokelma and bicarb, requested a dose of Lasix prior to transfer.  Outside facility called patient's nephrologist, Dr. St, who agrees patient should be transferred and agrees to consult.    Patient was admitted to the ICU given concerning labs and heart rate at outside facility.  On arrival patient's heart rate in the 60s.  Patient's potassium on arrival she 5.4, improved to 4.6 in the morning.  Medication changes  were made by nephrology.  Nephrology has plans for repeat labs on Tuesday, 5/23/2023 with follow-up.  Patient was advised to stop taking chlorthalidone.  Not currently on his med list but patient was told if he is taking Kerendia, to stop.  Patient seen on date of discharge, clinically and hemodynamically stable.  Patient provided concerning signs and symptoms prompting immediate medical attention, patient understanding and agreeable       DISCHARGE Follow Up Recommendations for labs and diagnostics:   Follow-up with PCP soon as possible  Follow-up with nephrology with repeat labs as scheduled      Day of Discharge     Vital Signs:  Temp:  [97.7 °F (36.5 °C)-98.7 °F (37.1 °C)] 98.4 °F (36.9 °C)  Heart Rate:  [54-72] 55  Resp:  [14-25] 14  BP: (126-155)/(44-67) 155/53  Physical Exam:    Constitutional: Awake, alert, no acute distress   Eyes: Pupils equal, sclerae anicteric, no conjunctival injection   HENT: NCAT, mucous membranes moist   Neck: Supple, no thyromegaly, no lymphadenopathy, trachea midline   Respiratory: Clear to auscultation bilaterally, nonlabored respirations    Cardiovascular: RRR, no murmurs, rubs, or gallops, palpable pedal pulses bilaterally   Gastrointestinal: Positive bowel sounds, soft, nontender, nondistended   Musculoskeletal: No bilateral ankle edema, no clubbing or cyanosis to extremities   Psychiatric: Appropriate affect, cooperative   Neurologic: Oriented x 3, strength symmetric in all extremities, Cranial Nerves grossly intact to confrontation, speech clear   Skin: No rashes     Discharge Details        Discharge Medications      Changes to Medications      Instructions Start Date   nitroglycerin 0.4 MG SL tablet  Commonly known as: NITROSTAT  What changed: See the new instructions.   DISSOLVE 1 TABLET UNDER THE TONGUE EVERY 5 MINUTES AS  NEEDED FOR CHEST PAIN. MAX  OF 3 TABLETS IN 15 MINUTES. CALL 911 IF PAIN PERSISTS.         Continue These Medications      Instructions Start Date    allopurinol 100 MG tablet  Commonly known as: ZYLOPRIM   100 mg, Oral, 2 Times Daily      amitriptyline 50 MG tablet  Commonly known as: ELAVIL   50 mg, Oral, Daily      atenolol 25 MG tablet  Commonly known as: TENORMIN   TAKE 1 TABLET BY MOUTH DAILY      Diclofenac Sodium 1 % gel gel  Commonly known as: VOLTAREN   4 g, Topical, 4 Times Daily      dilTIAZem SR 60 MG 12 hr capsule  Commonly known as: CARDIZEM SR   60 mg, Oral, 2 Times Daily      Eliquis 5 MG tablet tablet  Generic drug: apixaban   TAKE 1 TABLET BY MOUTH  TWICE DAILY      ergocalciferol 1.25 MG (09331 UT) capsule  Commonly known as: ERGOCALCIFEROL   50,000 Units, Oral, 3 Times Weekly, mwf      Farxiga 10 MG tablet  Generic drug: dapagliflozin Propanediol   10 mg, Oral, Daily      fenofibrate 160 MG tablet   160 mg, Oral, Every Evening      HYDROcodone-acetaminophen 7.5-325 MG per tablet  Commonly known as: NORCO   1 tablet, Oral, Every 8 Hours PRN      Insulin Lispro (1 Unit Dial) 100 UNIT/ML solution pen-injector  Commonly known as: HUMALOG   Subcutaneous, 3 Times Daily, Per sliding scale       insulin glargine 100 UNIT/ML injection  Commonly known as: LANTUS, SEMGLEE   65 Units, Subcutaneous, Nightly      Lantus SoloStar 100 UNIT/ML injection pen  Generic drug: Insulin Glargine   55 Units, Subcutaneous, Daily      levothyroxine 150 MCG tablet  Commonly known as: SYNTHROID, LEVOTHROID   150 mcg, Oral, Every Other Day      levothyroxine 175 MCG tablet  Commonly known as: SYNTHROID, LEVOTHROID   175 mcg, Oral, Every Other Day      losartan 50 MG tablet  Commonly known as: COZAAR   50 mg, Oral, Daily      metoclopramide 10 MG tablet  Commonly known as: REGLAN   10 mg, Oral, Daily      oxymetazoline 0.05 % nasal spray  Commonly known as: AFRIN   2 sprays, Nasal, Daily      pantoprazole 40 MG EC tablet  Commonly known as: PROTONIX   40 mg, Oral, Daily      penicillin v potassium 500 MG tablet  Commonly known as: VEETID   TAKE 2 TABLETS BY MOUTH NOW AND  THEN TAKE 1 TABLET BY MOUTH 4 TIMES DAILY UNTIL GONE      rosuvastatin 40 MG tablet  Commonly known as: CRESTOR   40 mg, Oral, Daily         Stop These Medications    chlorthalidone 25 MG tablet  Commonly known as: HYGROTON            Allergies   Allergen Reactions   • Oxycodone Hcl Dizziness   • Erythromycin Nausea And Vomiting     NOT an allergy   • Gabapentin Other (See Comments)     Weakness and jitters    NOT an allergy   • Cetirizine Rash   • Dopamine Other (See Comments)     hypotension  Action Taken: drops BP;   hypotension         Discharge Disposition:  Home or Self Care    Diet:  Hospital:  Diet Order   Procedures   • Diet: Renal Diets, Cardiac Diets, Diabetic Diets; Healthy Heart (2-3 Na+); Consistent Carbohydrate; Low Sodium (2-3g), Low Potassium, Low Phosphorus; Texture: Regular Texture (IDDSI 7); Fluid Consistency: Thin (IDDSI 0)       Discharge Activity:   Activity Instructions     Activity as Tolerated            CODE STATUS:  Code Status and Medical Interventions:   Ordered at: 05/18/23 1805     Code Status (Patient has no pulse and is not breathing):    CPR (Attempt to Resuscitate)     Medical Interventions (Patient has pulse or is breathing):    Full Support         Future Appointments   Date Time Provider Department Center   8/29/2023 10:15 AM Edgar Sprague MD [facility] [place] [facility]       Additional Instructions for the Follow-ups that You Need to Schedule     Discharge Follow-up with PCP   As directed       Currently Documented PCP:    Mauri Logan APRN    PCP Phone Number:    [phone]     Follow Up Details: In less than one week         Discharge Follow-up with Specified Provider: Dr St; 3 Days   As directed      To: Dr St    Follow Up: 3 Days               Pertinent  and/or Most Recent Results     PROCEDURES:       LAB RESULTS:      Lab 05/19/23  0248 05/18/23  1852 05/18/23  1841   WBC 5.18 4.21  --    HEMOGLOBIN 12.9* 12.6*  --    HEMATOCRIT 39.1 37.1*  --    PLATELETS 97*  91*  --    NEUTROS ABS 3.40 2.72  --    IMMATURE GRANS (ABS) 0.01 0.02  --    LYMPHS ABS 1.35 1.10  --    MONOS ABS 0.33 0.29  --    EOS ABS 0.07 0.06  --    MCV 82.3 82.4  --    LACTATE, ARTERIAL  --   --  0.99         Lab 05/19/23  0248 05/18/23  1852 05/18/23  1841   SODIUM 138 139  --    SODIUM, ARTERIAL  --   --  140.3   POTASSIUM 4.6 5.4*  --    CHLORIDE 100 108*  --    CO2 24.8 18.3*  --    ANION GAP 13.2 12.7  --    BUN 54* 59*  --    CREATININE 2.65* 2.78*  --    EGFR 25.6* 24.2*  --    GLUCOSE 305* 188*  --    GLUCOSE, ARTERIAL  --   --  194*   CALCIUM 9.9 9.8  --    IONIZED CALCIUM  --   --  1.20   MAGNESIUM 1.5* 1.7  --                          Lab 05/18/23  1841   PH, ARTERIAL 7.376   PCO2, ARTERIAL 32.9*   PO2 ART 91.5   O2 SATURATION ART 95.5   FIO2 21   HCO3 ART 18.9*   BASE EXCESS ART -5.4*   CARBOXYHEMOGLOBIN 0.6     Brief Urine Lab Results  (Last result in the past 365 days)      Color   Clarity   Blood   Leuk Est   Nitrite   Protein   CREAT   Urine HCG        03/17/23 1141           5.9               Microbiology Results (last 10 days)     ** No results found for the last 240 hours. **          XR Chest 1 View    Result Date: 5/18/2023  Impression: No acute findings in the chest and unchanged when compared to 12/15/2021. Electronically Signed: Gutierrez Navarro MD 2023/05/18 at 12:42 CDT Reading Location ID and State: [address] / [place] Tel [phone], Service support  [phone], Fax [phone]              Results for orders placed in visit on 01/28/22    Adult Transthoracic Echo Complete W/ Cont if Necessary Per Protocol    Interpretation Summary  Fibrocalcific mitral and aortic valves.  Mild aortic regurgitation.  Trace MR and trace TR.  Dilated left ventricle with normal left ventricular systolic function.      Labs Pending at Discharge:        Time spent on Discharge including face to face service:  37 minutes    Electronically signed by Iggy Solares MD, 05/19/23, 9:32 AM EDT.

## 2023-05-20 LAB — QT INTERVAL: 417 MS

## 2023-06-16 ENCOUNTER — DOCUMENTATION (OUTPATIENT)
Dept: TELEMETRY | Facility: HOSPITAL | Age: 68
End: 2023-06-16
Payer: MEDICARE

## 2023-06-22 PROBLEM — M51.36 DEGENERATION OF LUMBAR INTERVERTEBRAL DISC: Status: ACTIVE | Noted: 2021-09-28

## 2023-06-22 PROBLEM — R55 SYNCOPE: Status: RESOLVED | Noted: 2021-11-16 | Resolved: 2023-06-22

## 2023-06-22 PROBLEM — E87.5 HYPERKALEMIA: Status: RESOLVED | Noted: 2023-05-18 | Resolved: 2023-06-22

## 2023-06-22 PROBLEM — E78.2 MIXED HYPERLIPIDEMIA DUE TO TYPE 2 DIABETES MELLITUS: Status: ACTIVE | Noted: 2019-07-24

## 2023-06-22 PROBLEM — G56.00 CARPAL TUNNEL SYNDROME: Status: ACTIVE | Noted: 2021-10-19

## 2023-06-22 PROBLEM — D61.818 ACQUIRED PANCYTOPENIA: Status: ACTIVE | Noted: 2018-07-06

## 2023-06-22 PROBLEM — E11.69 MIXED HYPERLIPIDEMIA DUE TO TYPE 2 DIABETES MELLITUS: Status: ACTIVE | Noted: 2019-07-24

## 2023-06-22 PROBLEM — N17.9 ACUTE RENAL FAILURE SUPERIMPOSED ON CHRONIC KIDNEY DISEASE: Status: RESOLVED | Noted: 2023-05-19 | Resolved: 2023-06-22

## 2023-06-22 PROBLEM — E78.2 MIXED HYPERLIPIDEMIA: Status: ACTIVE | Noted: 2021-10-19

## 2023-06-22 PROBLEM — E11.69 MIXED HYPERLIPIDEMIA DUE TO TYPE 2 DIABETES MELLITUS: Status: RESOLVED | Noted: 2019-07-24 | Resolved: 2023-06-22

## 2023-06-22 PROBLEM — N18.32 STAGE 3B CHRONIC KIDNEY DISEASE: Status: ACTIVE | Noted: 2021-09-18

## 2023-06-22 PROBLEM — M51.369 DEGENERATION OF LUMBAR INTERVERTEBRAL DISC: Status: ACTIVE | Noted: 2021-09-28

## 2023-06-22 PROBLEM — Z95.1 HX OF CABG: Status: RESOLVED | Noted: 2021-11-14 | Resolved: 2023-06-22

## 2023-06-22 PROBLEM — M54.16 LUMBAR RADICULOPATHY: Status: ACTIVE | Noted: 2021-09-28

## 2023-06-22 PROBLEM — N18.9 ACUTE RENAL FAILURE SUPERIMPOSED ON CHRONIC KIDNEY DISEASE: Status: RESOLVED | Noted: 2023-05-19 | Resolved: 2023-06-22

## 2023-06-22 PROBLEM — E78.2 MIXED HYPERLIPIDEMIA DUE TO TYPE 2 DIABETES MELLITUS: Status: RESOLVED | Noted: 2019-07-24 | Resolved: 2023-06-22

## 2023-08-10 ENCOUNTER — TELEPHONE (OUTPATIENT)
Dept: CARDIOLOGY | Facility: CLINIC | Age: 68
End: 2023-08-10
Payer: MEDICARE

## 2023-08-10 RX ORDER — LOSARTAN POTASSIUM 100 MG/1
100 TABLET ORAL DAILY
Qty: 90 TABLET | Refills: 3 | Status: SHIPPED | OUTPATIENT
Start: 2023-08-10

## 2023-08-10 NOTE — TELEPHONE ENCOUNTER
----- Message from LIZZ Prieto sent at 8/10/2023  9:03 AM EDT -----  Increase amlodipine dose to 10 mg daily , continue to monitor BP  ----- Message -----  From: Rachael Hand RegSched Rep  Sent: 8/10/2023   8:31 AM EDT  To: LIZZ Prieto

## 2023-09-01 ENCOUNTER — TELEPHONE (OUTPATIENT)
Dept: CARDIOLOGY | Facility: CLINIC | Age: 68
End: 2023-09-01
Payer: MEDICARE

## 2023-09-01 RX ORDER — HYDROCHLOROTHIAZIDE 12.5 MG/1
12.5 CAPSULE, GELATIN COATED ORAL DAILY
Qty: 90 CAPSULE | Refills: 3 | Status: SHIPPED | OUTPATIENT
Start: 2023-09-01

## 2023-09-01 NOTE — TELEPHONE ENCOUNTER
----- Message from LIZZ Prieto sent at 9/1/2023  1:56 PM EDT -----  Add hctz 12.5 mg daily, continue to monitor BP  ----- Message -----  From: Rachael Hand RegSched Rep  Sent: 9/1/2023   1:25 PM EDT  To: LIZZ Prieto

## 2023-10-03 RX ORDER — ROSUVASTATIN CALCIUM 10 MG/1
10 TABLET, COATED ORAL DAILY
Qty: 90 TABLET | Refills: 3 | Status: SHIPPED | OUTPATIENT
Start: 2023-10-03

## 2023-10-19 ENCOUNTER — TELEPHONE (OUTPATIENT)
Dept: CARDIOLOGY | Facility: CLINIC | Age: 68
End: 2023-10-19
Payer: MEDICARE

## 2023-10-19 NOTE — TELEPHONE ENCOUNTER
----- Message from LIZZ Prieto sent at 10/19/2023  2:38 PM EDT -----  BP log looks great, continue current meds  ----- Message -----  From: Rachael Hand RegSched Rep  Sent: 10/19/2023   2:27 PM EDT  To: LIZZ Prieto

## 2023-12-20 ENCOUNTER — TRANSCRIBE ORDERS (OUTPATIENT)
Dept: ADMINISTRATIVE | Facility: HOSPITAL | Age: 68
End: 2023-12-20
Payer: MEDICARE

## 2023-12-20 DIAGNOSIS — Z01.818 PRE-OP TESTING: ICD-10-CM

## 2023-12-20 DIAGNOSIS — I73.9 PERIPHERAL VASCULAR DISEASE, UNSPECIFIED: Primary | ICD-10-CM

## 2023-12-21 ENCOUNTER — HOSPITAL ENCOUNTER (OUTPATIENT)
Dept: CARDIOLOGY | Facility: HOSPITAL | Age: 68
Discharge: HOME | End: 2023-12-21
Payer: MEDICARE

## 2023-12-21 ENCOUNTER — LAB (OUTPATIENT)
Dept: LAB | Facility: HOSPITAL | Age: 68
End: 2023-12-21
Payer: MEDICARE

## 2023-12-21 DIAGNOSIS — Z01.818 PRE-OP TESTING: ICD-10-CM

## 2023-12-21 DIAGNOSIS — I73.9 PERIPHERAL VASCULAR DISEASE, UNSPECIFIED: ICD-10-CM

## 2023-12-21 LAB
ABO GROUP BLD: NORMAL
ALBUMIN SERPL-MCNC: 4 G/DL (ref 3.5–5.2)
ALBUMIN/GLOB SERPL: 1.4 G/DL
ALP SERPL-CCNC: 70 U/L (ref 39–117)
ALT SERPL W P-5'-P-CCNC: 12 U/L (ref 1–41)
ANION GAP SERPL CALCULATED.3IONS-SCNC: 10.6 MMOL/L (ref 5–15)
AST SERPL-CCNC: 11 U/L (ref 1–40)
BILIRUB SERPL-MCNC: 0.4 MG/DL (ref 0–1.2)
BUN SERPL-MCNC: 38 MG/DL (ref 8–23)
BUN/CREAT SERPL: 20 (ref 7–25)
CALCIUM SPEC-SCNC: 10 MG/DL (ref 8.6–10.5)
CHLORIDE SERPL-SCNC: 107 MMOL/L (ref 98–107)
CO2 SERPL-SCNC: 23.4 MMOL/L (ref 22–29)
CREAT SERPL-MCNC: 1.9 MG/DL (ref 0.76–1.27)
EGFRCR SERPLBLD CKD-EPI 2021: 38.2 ML/MIN/1.73
GLOBULIN UR ELPH-MCNC: 2.9 GM/DL
GLUCOSE SERPL-MCNC: 183 MG/DL (ref 65–99)
POTASSIUM SERPL-SCNC: 4.9 MMOL/L (ref 3.5–5.2)
PROT SERPL-MCNC: 6.9 G/DL (ref 6–8.5)
QT INTERVAL: 403 MS
QTC INTERVAL: 426 MS
RH BLD: POSITIVE
SODIUM SERPL-SCNC: 141 MMOL/L (ref 136–145)

## 2023-12-21 PROCEDURE — 80053 COMPREHEN METABOLIC PANEL: CPT

## 2023-12-21 PROCEDURE — 86901 BLOOD TYPING SEROLOGIC RH(D): CPT

## 2023-12-21 PROCEDURE — 36415 COLL VENOUS BLD VENIPUNCTURE: CPT

## 2023-12-21 PROCEDURE — 86900 BLOOD TYPING SEROLOGIC ABO: CPT

## 2023-12-21 PROCEDURE — 93005 ELECTROCARDIOGRAM TRACING: CPT

## 2024-01-01 LAB
QT INTERVAL: 403 MS
QTC INTERVAL: 426 MS

## 2024-01-15 NOTE — PROGRESS NOTES
[facility]  Cardiology progress Note    Patient Name: Beltran Ambrose  : 1955    CHIEF COMPLAINT  CAD        Subjective   Subjective     HISTORY OF PRESENT ILLNESS    Beltran Ambrose is a 68 y.o. male CAD s/p CABG.  No chest pain or shortness of breath.    REVIEW OF SYSTEMS    Constitutional:    No fever, no weight loss  Skin:     No rash  Otolaryngeal:    No difficulty swallowing  Cardiovascular: See HPI.  Pulmonary:    No cough, no sputum production    Personal History     Social History:    reports that he has quit smoking. He has never used smokeless tobacco. He reports that he does not drink alcohol and does not use drugs.    Home Medications:  Current Outpatient Medications on File Prior to Visit   Medication Sig    Allopurinol 200 MG tablet Take 200 mg by mouth Daily.    amitriptyline (ELAVIL) 50 MG tablet Take 1 tablet by mouth Daily.    amLODIPine (NORVASC) 10 MG tablet Take 1 tablet by mouth Daily.    atenolol (TENORMIN) 25 MG tablet TAKE 1 TABLET BY MOUTH DAILY    dapagliflozin Propanediol (Farxiga) 10 MG tablet Take 10 mg by mouth Daily.    Diclofenac Sodium (VOLTAREN) 1 % gel gel Apply 4 g topically to the appropriate area as directed 4 (Four) Times a Day.    Eliquis 5 MG tablet tablet TAKE 1 TABLET BY MOUTH  TWICE DAILY    ergocalciferol (ERGOCALCIFEROL) 1.25 MG (18721 UT) capsule Take 1 capsule by mouth 3 (Three) Times a Week. mwf    fenofibrate 160 MG tablet Take 1 tablet by mouth Every Evening.    hydroCHLOROthiazide (MICROZIDE) 12.5 MG capsule Take 1 capsule by mouth Daily.    HYDROcodone-acetaminophen (NORCO) 7.5-325 MG per tablet Take 1 tablet by mouth Every 8 (Eight) Hours As Needed.    Inclisiran Sodium 284 MG/1.5ML solution prefilled syringe Inject  under the skin into the appropriate area as directed. Gets injections at [facility], next dose is 2023    Insulin Glargine (Lantus SoloStar) 100 UNIT/ML injection pen Inject 55 Units under the skin into the  appropriate area as directed Daily.    Insulin Lispro, 1 Unit Dial, (HUMALOG) 100 UNIT/ML solution pen-injector Inject  under the skin into the appropriate area as directed 3 (Three) Times a Day. Per sliding scale    levothyroxine (SYNTHROID, LEVOTHROID) 150 MCG tablet Take 1 tablet by mouth Every Other Day.    levothyroxine (SYNTHROID, LEVOTHROID) 175 MCG tablet Take 1 tablet by mouth Every Other Day.    lidocaine (XYLOCAINE) 5 % ointment Apply 1 application  topically to the appropriate area as directed.    losartan (Cozaar) 100 MG tablet Take 1 tablet by mouth Daily.    nitroglycerin (NITROSTAT) 0.4 MG SL tablet DISSOLVE 1 TABLET UNDER THE TONGUE EVERY 5 MINUTES AS  NEEDED FOR CHEST PAIN. MAX  OF 3 TABLETS IN 15 MINUTES. CALL 911 IF PAIN PERSISTS. (Patient taking differently: Place 1 tablet under the tongue Every 5 (Five) Minutes As Needed.)    oxymetazoline (AFRIN) 0.05 % nasal spray 2 sprays into the nostril(s) as directed by provider Daily.    pantoprazole (PROTONIX) 40 MG EC tablet Take 1 tablet by mouth Daily.    rosuvastatin (CRESTOR) 10 MG tablet Take 1 tablet by mouth Daily.     No current facility-administered medications on file prior to visit.       Past Medical History:   Diagnosis Date    Atrial fibrillation     CAD s/p CABG 11/14/2021    Myocardial infarction        Allergies:  Allergies   Allergen Reactions    Oxycodone Dizziness and Other (See Comments)    Oxycodone Hcl Dizziness    Azithromycin Unknown - Low Severity    Erythromycin Nausea And Vomiting     NOT an allergy    Gabapentin Other (See Comments)     Weakness and jitters    NOT an allergy    Oxychlorosene Dizziness    Cetirizine Rash and Other (See Comments)    Dopamine Other (See Comments)     hypotension  Action Taken: drops BP;   hypotension         Objective    Objective       Vitals:   Heart Rate:  [61] 61  BP: (119)/(50) 119/50  Body mass index is 34.18 kg/m².     PHYSICAL EXAM:    General Appearance:   well developed  well  "nourished  HENT:   oropharynx moist  lips not cyanotic  Neck:  thyroid not enlarged  supple  Respiratory:  no respiratory distress  normal breath sounds  no rales  Cardiovascular:  no jugular venous distention  regular rhythm  apical impulse normal  S1 normal, S2 normal  no S3, no S4   no murmur  no rub, no thrill  carotid pulses normal; no bruit  pedal pulses normal  lower extremity edema: none    Skin:   warm, dry  Psychiatric:  judgement and insight appropriate  normal mood and affect        Result Review:  I have personally reviewed the available results from  [x]  Laboratory  [x]  EKG  [x]  Cardiology  [x]  Medications  [x]  Old records  []  Other:     Procedures  No results found for: \"CHOL\"  Lab Results   Component Value Date    TRIG 872 (H) 12/04/2023    TRIG 212 (H) 03/17/2023    TRIG 1,671 (H) 09/01/2022     Lab Results   Component Value Date    HDL 22 (L) 12/04/2023    HDL 25 03/17/2023    HDL 20 (L) 09/01/2022     Lab Results   Component Value Date    LDL 16 12/04/2023    LDL  12/04/2023     Unable to calc due to high Trig  Direct LDL ordered    LDL NEG 13 03/17/2023     Lab Results   Component Value Date    VLDL UNABLE TO CALCULATE 07/04/2018    VLDL UNABLE TO CALCULATE 07/03/2018     Results for orders placed in visit on 01/28/22    Adult Transthoracic Echo Complete W/ Cont if Necessary Per Protocol    Interpretation Summary  Fibrocalcific mitral and aortic valves.  Mild aortic regurgitation.  Trace MR and trace TR.  Dilated left ventricle with normal left ventricular systolic function.     Impression/Plan:   1.  Paroxysmal atrial fibrillation : Continue Eliquis 5 mg twice a day for stroke prevention.    Continue atenolol 25 mg a day.  Able to tolerate these medicines without any side effects  2.  Coronary s/p CABG stable: Continue atenolol 25 mg a day.  No chest pain.  3.  Hyperlipidemia: Continue Crestor 10 mg a day.  Continue fenofibrate 160 mg a day.  Monitor lipid and hepatic profile.  Superhigh " hypertriglyceridemia managed by his endocrinologist.  4.  Essential hypertension uncontrolled: Continue losartan 100 mg once a day.  Continue manidipine 10 mg once a day.  Monitor blood pressure regularly           Edgar Sprague MD   01/17/24   10:45 EST

## 2024-01-17 ENCOUNTER — OFFICE VISIT (OUTPATIENT)
Dept: CARDIOLOGY | Facility: CLINIC | Age: 69
End: 2024-01-17
Payer: MEDICARE

## 2024-01-17 VITALS
DIASTOLIC BLOOD PRESSURE: 50 MMHG | SYSTOLIC BLOOD PRESSURE: 119 MMHG | BODY MASS INDEX: 34.13 KG/M2 | HEIGHT: 72 IN | WEIGHT: 252 LBS | HEART RATE: 61 BPM

## 2024-01-17 DIAGNOSIS — I25.10 CORONARY ARTERY DISEASE INVOLVING NATIVE CORONARY ARTERY OF NATIVE HEART WITHOUT ANGINA PECTORIS: Primary | ICD-10-CM

## 2024-01-17 DIAGNOSIS — I48.0 PAROXYSMAL ATRIAL FIBRILLATION: ICD-10-CM

## 2024-01-17 DIAGNOSIS — E78.2 HYPERLIPEMIA, MIXED: ICD-10-CM

## 2024-01-17 DIAGNOSIS — I10 HYPERTENSION, ESSENTIAL: ICD-10-CM

## 2024-01-17 DIAGNOSIS — Z95.1 HX OF CABG: ICD-10-CM

## 2024-01-17 PROCEDURE — 99214 OFFICE O/P EST MOD 30 MIN: CPT | Performed by: SPECIALIST

## 2024-01-17 PROCEDURE — 1159F MED LIST DOCD IN RCRD: CPT | Performed by: SPECIALIST

## 2024-01-17 PROCEDURE — 1160F RVW MEDS BY RX/DR IN RCRD: CPT | Performed by: SPECIALIST

## 2024-01-17 PROCEDURE — 3074F SYST BP LT 130 MM HG: CPT | Performed by: SPECIALIST

## 2024-01-17 PROCEDURE — 3078F DIAST BP <80 MM HG: CPT | Performed by: SPECIALIST

## 2024-01-30 ENCOUNTER — TELEPHONE (OUTPATIENT)
Dept: CARDIOLOGY | Facility: CLINIC | Age: 69
End: 2024-01-30
Payer: MEDICARE

## 2024-01-30 NOTE — TELEPHONE ENCOUNTER
Procedure: Dental Extractions    Med Directive: Eliquis    PMH: CAD prior CABG, afib, HLD, HTN    Last Seen: 1/17/24

## 2024-01-31 RX ORDER — NITROGLYCERIN 0.4 MG/1
TABLET SUBLINGUAL
Qty: 100 TABLET | Refills: 0 | Status: SHIPPED | OUTPATIENT
Start: 2024-01-31

## 2024-03-19 RX ORDER — ATENOLOL 25 MG/1
TABLET ORAL
Qty: 90 TABLET | Refills: 3 | Status: SHIPPED | OUTPATIENT
Start: 2024-03-19

## 2024-04-15 RX ORDER — APIXABAN 5 MG/1
TABLET, FILM COATED ORAL
Qty: 180 TABLET | Refills: 3 | Status: SHIPPED | OUTPATIENT
Start: 2024-04-15

## 2024-04-26 RX ORDER — NITROGLYCERIN 0.4 MG/1
TABLET SUBLINGUAL
Qty: 100 TABLET | Refills: 0 | Status: SHIPPED | OUTPATIENT
Start: 2024-04-26

## 2024-06-28 RX ORDER — NITROGLYCERIN 0.4 MG/1
TABLET SUBLINGUAL
Qty: 100 TABLET | Refills: 3 | Status: SHIPPED | OUTPATIENT
Start: 2024-06-28

## 2024-07-09 ENCOUNTER — TELEPHONE (OUTPATIENT)
Dept: CARDIOLOGY | Facility: CLINIC | Age: 69
End: 2024-07-09
Payer: MEDICARE

## 2024-07-09 NOTE — TELEPHONE ENCOUNTER
Caller: HUGO GUTIERREZ    Relationship to patient: Emergency Contact    Best call back number: [phone]     New or established patient?  [] New  [x] Established    Date of discharge: 07.08.24    Facility discharged from: [facility]    Diagnosis/Symptoms: LOW HEART RATE, POTASSIUM PROBLEMS, STAGE 3 KIDNEY FAILURE    PATIENT WAS TAKEN OFF OF MEDICATIONS THAT  PRESCRIPED (ATENOLOL AND LOSARTAN)    Length of stay (If applicable): 3 DAYS

## 2024-07-13 NOTE — PROGRESS NOTES
[facility]  Cardiology progress Note    Patient Name: Beltran Ambrose  : 1955    CHIEF COMPLAINT  CAD        Subjective   Subjective     HISTORY OF PRESENT ILLNESS    Beltran Ambrose is a 69 y.o. male with CAD status post CABG.  No chest pain or shortness of breath.  Recently in [facility] with severe hyperkalemia and bradycardia.    REVIEW OF SYSTEMS    Constitutional:    No fever, no weight loss  Skin:     No rash  Otolaryngeal:    No difficulty swallowing  Cardiovascular: See HPI.  Pulmonary:    No cough, no sputum production    Personal History     Social History:    reports that he has quit smoking. He has never used smokeless tobacco. He reports that he does not drink alcohol and does not use drugs.    Home Medications:  Current Outpatient Medications on File Prior to Visit   Medication Sig    Allopurinol 200 MG tablet Take 200 mg by mouth Daily.    amitriptyline (ELAVIL) 50 MG tablet Take 1 tablet by mouth Daily.    [id number]-Ellutquxmr-Wextmrhkd -100 MG capsule Take 2 capsules by mouth 2 (Two) Times a Day.    Cyanocobalamin 1000 MCG capsule Take 1 tablet by mouth Daily.    dapagliflozin Propanediol (Farxiga) 10 MG tablet Take 10 mg by mouth Daily.    Diclofenac Sodium (VOLTAREN) 1 % gel gel Apply 4 g topically to the appropriate area as directed 4 (Four) Times a Day.    Eliquis 5 MG tablet tablet TAKE 1 TABLET BY MOUTH TWICE  DAILY    ergocalciferol (ERGOCALCIFEROL) 1.25 MG (22836 UT) capsule Take 1 capsule by mouth 3 (Three) Times a Week. mwf    fenofibrate 160 MG tablet Take 1 tablet by mouth Every Evening.    hydroCHLOROthiazide (MICROZIDE) 12.5 MG capsule Take 1 capsule by mouth Daily.    HYDROcodone-acetaminophen (NORCO) 7.5-325 MG per tablet Take 1 tablet by mouth Every 8 (Eight) Hours As Needed.    Inclisiran Sodium 284 MG/1.5ML solution prefilled syringe Inject  under the skin into the appropriate area as directed. Gets injections at [facility], next dose is  November 2023    Insulin Glargine (Lantus SoloStar) 100 UNIT/ML injection pen Inject 55 Units under the skin into the appropriate area as directed Daily.    Insulin Lispro, 1 Unit Dial, (HUMALOG) 100 UNIT/ML solution pen-injector Inject  under the skin into the appropriate area as directed 3 (Three) Times a Day. Per sliding scale    levothyroxine (SYNTHROID, LEVOTHROID) 175 MCG tablet Take 1 tablet by mouth Every Other Day.    Magnesium 200 MG tablet Take 200 mg by mouth 2 (Two) Times a Day.    metoclopramide (REGLAN) 10 MG tablet Take 1 tablet by mouth Daily.    nitroglycerin (NITROSTAT) 0.4 MG SL tablet DISSOLVE 1 TABLET UNDER THE  TONGUE EVERY 5 MINUTES AS NEEDED FOR CHEST PAIN. MAX OF 3 TABLETS IN 15 MINUTES. CALL 911 IF PAIN  PERSISTS.    Omega-3 Fatty Acids (fish oil) 1000 MG capsule capsule Take 1,200 mg by mouth 2 (Two) Times a Day With Meals.    oxymetazoline (AFRIN) 0.05 % nasal spray 2 sprays into the nostril(s) as directed by provider Daily.    pantoprazole (PROTONIX) 40 MG EC tablet Take 1 tablet by mouth Daily.    rosuvastatin (CRESTOR) 10 MG tablet Take 1 tablet by mouth Daily.    sodium polystyrene (KAYEXALATE) powder Take 30 g by mouth As Needed.    [DISCONTINUED] amLODIPine (NORVASC) 10 MG tablet Take 1 tablet by mouth Daily.    [DISCONTINUED] atenolol (TENORMIN) 25 MG tablet TAKE 1 TABLET BY MOUTH DAILY (Patient not taking: Reported on 7/16/2024)    [DISCONTINUED] levothyroxine (SYNTHROID, LEVOTHROID) 150 MCG tablet Take 1 tablet by mouth Every Other Day.    [DISCONTINUED] lidocaine (XYLOCAINE) 5 % ointment Apply 1 application  topically to the appropriate area as directed. (Patient not taking: Reported on 7/16/2024)    [DISCONTINUED] losartan (Cozaar) 100 MG tablet Take 1 tablet by mouth Daily. (Patient not taking: Reported on 7/16/2024)     No current facility-administered medications on file prior to visit.       Past Medical History:   Diagnosis Date    Atrial fibrillation     CAD s/p CABG  "11/14/2021    Myocardial infarction        Allergies:  Allergies   Allergen Reactions    Oxycodone Dizziness and Other (See Comments)    Oxycodone Hcl Dizziness    Azithromycin Unknown - Low Severity    Erythromycin Nausea And Vomiting     NOT an allergy    Gabapentin Other (See Comments)     Weakness and jitters    NOT an allergy    Oxychlorosene Dizziness    Cetirizine Rash and Other (See Comments)    Dopamine Other (See Comments)     hypotension  Action Taken: drops BP;   hypotension         Objective    Objective       Vitals:   Heart Rate:  [63] 63  BP: (140)/(53) 140/53  Body mass index is 31.87 kg/m².     PHYSICAL EXAM:    General Appearance:   well developed  well nourished  HENT:   oropharynx moist  lips not cyanotic  Neck:  thyroid not enlarged  supple  Respiratory:  no respiratory distress  normal breath sounds  no rales  Cardiovascular:  no jugular venous distention  regular rhythm  apical impulse normal  S1 normal, S2 normal  no S3, no S4   no murmur  no rub, no thrill  carotid pulses normal; no bruit  pedal pulses normal  lower extremity edema: none    Skin:   warm, dry  Psychiatric:  judgement and insight appropriate  normal mood and affect        Result Review:  I have personally reviewed the available results from  [x]  Laboratory  [x]  EKG  [x]  Cardiology  [x]  Medications  [x]  Old records  []  Other:     Procedures  No results found for: \"CHOL\"  Lab Results   Component Value Date    TRIG 355 (H) 06/11/2024    TRIG 488 (H) 04/18/2024    TRIG 699 (H) 03/08/2024     Lab Results   Component Value Date    HDL 21 (L) 06/11/2024    HDL 22 (L) 04/18/2024    HDL 21 (L) 03/08/2024     Lab Results   Component Value Date    LDL 7 06/11/2024    LDL UNABLE TO CALCULATE 06/11/2024    LDL 11 03/08/2024    LDL  03/08/2024     Unable to calc due to high Trig  Direct LDL ordered     Lab Results   Component Value Date    VLDL UNABLE TO CALCULATE 07/04/2018    VLDL UNABLE TO CALCULATE 07/03/2018     Results for " orders placed in visit on 01/28/22    Adult Transthoracic Echo Complete W/ Cont if Necessary Per Protocol    Interpretation Summary  Fibrocalcific mitral and aortic valves.  Mild aortic regurgitation.  Trace MR and trace TR.  Dilated left ventricle with normal left ventricular systolic function.     Impression/Plan:  1.  Paroxysmal atrial fibrillation : Continue Eliquis 5 mg twice a day for stroke prevention.  Atenolol and Cardizem stopped because of bradycardia secondary to hyperkalemia.  48-hour Holter monitoring to evaluate for any significant arrhythmias.    2.  Coronary s/p CABG stable: .  No chest pain.  3.  Mixed hyperlipidemia: Continue Crestor 10 mg a day.  Continue fenofibrate 160 mg a day.  Monitor lipid and hepatic profile.  Superhigh hypertriglyceridemia managed by his endocrinologist.  4.  Essential hypertension uncontrolled: .  Continue manidipine 10 mg once a day.  Monitor blood pressure regularly           Edgar Sprague MD   07/16/24   11:13 EDT

## 2024-07-16 ENCOUNTER — OFFICE VISIT (OUTPATIENT)
Dept: CARDIOLOGY | Facility: CLINIC | Age: 69
End: 2024-07-16
Payer: MEDICARE

## 2024-07-16 VITALS
HEART RATE: 63 BPM | WEIGHT: 235 LBS | HEIGHT: 72 IN | DIASTOLIC BLOOD PRESSURE: 53 MMHG | SYSTOLIC BLOOD PRESSURE: 140 MMHG | BODY MASS INDEX: 31.83 KG/M2

## 2024-07-16 DIAGNOSIS — I25.10 CORONARY ARTERY DISEASE INVOLVING NATIVE CORONARY ARTERY OF NATIVE HEART WITHOUT ANGINA PECTORIS: ICD-10-CM

## 2024-07-16 DIAGNOSIS — Z95.1 HX OF CABG: ICD-10-CM

## 2024-07-16 DIAGNOSIS — I10 HYPERTENSION, ESSENTIAL: ICD-10-CM

## 2024-07-16 DIAGNOSIS — E78.2 HYPERLIPEMIA, MIXED: ICD-10-CM

## 2024-07-16 DIAGNOSIS — I48.0 PAROXYSMAL ATRIAL FIBRILLATION: Primary | ICD-10-CM

## 2024-07-16 PROCEDURE — 99214 OFFICE O/P EST MOD 30 MIN: CPT | Performed by: SPECIALIST

## 2024-07-16 PROCEDURE — 3078F DIAST BP <80 MM HG: CPT | Performed by: SPECIALIST

## 2024-07-16 PROCEDURE — 3077F SYST BP >= 140 MM HG: CPT | Performed by: SPECIALIST

## 2024-07-16 PROCEDURE — 1160F RVW MEDS BY RX/DR IN RCRD: CPT | Performed by: SPECIALIST

## 2024-07-16 PROCEDURE — 1159F MED LIST DOCD IN RCRD: CPT | Performed by: SPECIALIST

## 2024-07-16 RX ORDER — METOCLOPRAMIDE 10 MG/1
10 TABLET ORAL DAILY
COMMUNITY
Start: 2024-04-25

## 2024-07-16 RX ORDER — MAGNESIUM 200 MG
200 TABLET ORAL 2 TIMES DAILY
COMMUNITY

## 2024-07-16 RX ORDER — AMLODIPINE BESYLATE 10 MG/1
10 TABLET ORAL DAILY
Qty: 90 TABLET | Refills: 3 | Status: SHIPPED | OUTPATIENT
Start: 2024-07-16

## 2024-07-16 RX ORDER — CHLORAL HYDRATE 500 MG
1200 CAPSULE ORAL 2 TIMES DAILY WITH MEALS
COMMUNITY

## 2024-07-16 RX ORDER — SODIUM POLYSTYRENE SULFONATE 4.1 MEQ/G
30 POWDER, FOR SUSPENSION ORAL; RECTAL AS NEEDED
COMMUNITY
Start: 2024-06-11

## 2024-11-22 ENCOUNTER — OFFICE VISIT (OUTPATIENT)
Dept: ORTHOPEDIC SURGERY | Facility: CLINIC | Age: 69
End: 2024-11-22
Payer: MEDICARE

## 2024-11-22 VITALS
WEIGHT: 235 LBS | BODY MASS INDEX: 31.83 KG/M2 | HEART RATE: 72 BPM | OXYGEN SATURATION: 94 % | SYSTOLIC BLOOD PRESSURE: 145 MMHG | HEIGHT: 72 IN | DIASTOLIC BLOOD PRESSURE: 66 MMHG

## 2024-11-22 DIAGNOSIS — M19.011 ARTHRITIS OF BOTH ACROMIOCLAVICULAR JOINTS: ICD-10-CM

## 2024-11-22 DIAGNOSIS — M25.511 RIGHT SHOULDER PAIN, UNSPECIFIED CHRONICITY: Primary | ICD-10-CM

## 2024-11-22 DIAGNOSIS — M25.512 LEFT SHOULDER PAIN, UNSPECIFIED CHRONICITY: ICD-10-CM

## 2024-11-22 DIAGNOSIS — M19.012 ARTHRITIS OF BOTH ACROMIOCLAVICULAR JOINTS: ICD-10-CM

## 2024-11-22 RX ORDER — LIDOCAINE HYDROCHLORIDE 10 MG/ML
5 INJECTION, SOLUTION INFILTRATION; PERINEURAL
Status: COMPLETED | OUTPATIENT
Start: 2024-11-22 | End: 2024-11-22

## 2024-11-22 RX ORDER — TRIAMCINOLONE ACETONIDE 40 MG/ML
40 INJECTION, SUSPENSION INTRA-ARTICULAR; INTRAMUSCULAR
Status: COMPLETED | OUTPATIENT
Start: 2024-11-22 | End: 2024-11-22

## 2024-11-22 RX ADMIN — TRIAMCINOLONE ACETONIDE 40 MG: 40 INJECTION, SUSPENSION INTRA-ARTICULAR; INTRAMUSCULAR at 15:58

## 2024-11-22 RX ADMIN — LIDOCAINE HYDROCHLORIDE 5 ML: 10 INJECTION, SOLUTION INFILTRATION; PERINEURAL at 15:58

## 2024-11-22 NOTE — PROGRESS NOTES
"Chief Complaint  Initial Evaluation of the Right Shoulder and Initial Evaluation of the Left Shoulder     Subjective      Beltran Ambrose presents to [facility] for initial evaluation of the bilateral shoulders.  His right shoulder is worse then the left.  He has pain with overhead and forward reaching.  He has pain on the anterior aspect of the shoulder and down the arm.  His pain waxes and wanes.        Allergies   Allergen Reactions    Oxycodone Dizziness and Other (See Comments)    Oxycodone Hcl Dizziness    Azithromycin Unknown - Low Severity    Erythromycin Nausea And Vomiting     NOT an allergy    Gabapentin Other (See Comments)     Weakness and jitters    NOT an allergy    Oxychlorosene Dizziness    Cetirizine Rash and Other (See Comments)    Dopamine Other (See Comments)     hypotension  Action Taken: drops BP;   hypotension          Social History     Socioeconomic History    Marital status:    Tobacco Use    Smoking status: Former    Smokeless tobacco: Never   Vaping Use    Vaping status: Never Used   Substance and Sexual Activity    Alcohol use: Never    Drug use: Never        I reviewed the patient's chief complaint, history of present illness, review of systems, past medical history, surgical history, family history, social history, medications, and allergy list.     Review of Systems     Constitutional: Denies fevers, chills, weight loss  Cardiovascular: Denies chest pain, shortness of breath  Skin: Denies rashes, acute skin changes  Neurologic: Denies headache, loss of consciousness        Vital Signs:   /66   Pulse 72   Ht 182.9 cm (72.01\")   Wt 107 kg (235 lb)   SpO2 94%   BMI 31.86 kg/m²          Physical Exam  General: Alert. No acute distress    Ortho Exam        BILATERAL SHOULDERS Forward flexion 170 with pain . Abduction 100. External rotation 50. Internal rotation Si joint . Positive Cross body adduction. Supraspinatus strength 4+/5. " Infraspinatus Strength 5/5. Infrared subscap 5/5. Positive Rader. Positive Neer. Negative Apprehension. Negative Lift off. (Negative Obriens. Sensation intact to light touch, median, radial, ulnar nerve. Positive AIN, PIN, ulnar nerve motor. Positive pulses. Positive Impingement signs. Good strength in triceps, biceps, deltoid, wrist extensors and wrist flexors. Tender to palpation to the anterior aspect of the shoulder and down the arm.         Large Joint Arthrocentesis: R subacromial bursa  Date/Time: 11/22/2024 3:58 PM  Consent given by: patient  Site marked: site marked  Timeout: Immediately prior to procedure a time out was called to verify the correct patient, procedure, equipment, support staff and site/side marked as required   Supporting Documentation  Indications: pain   Procedure Details  Location: shoulder - R subacromial bursa  Preparation: Patient was prepped and draped in the usual sterile fashion  Needle gauge: 21 G.  Medications administered: 5 mL lidocaine 1 %; 40 mg triamcinolone acetonide 40 MG/ML  Patient tolerance: patient tolerated the procedure well with no immediate complications      Large Joint Arthrocentesis: L subacromial bursa  Date/Time: 11/22/2024 3:58 PM  Consent given by: patient  Site marked: site marked  Timeout: Immediately prior to procedure a time out was called to verify the correct patient, procedure, equipment, support staff and site/side marked as required   Supporting Documentation  Indications: pain   Procedure Details  Location: shoulder - L subacromial bursa  Preparation: Patient was prepped and draped in the usual sterile fashion  Needle gauge: 21 G.  Medications administered: 5 mL lidocaine 1 %; 40 mg triamcinolone acetonide 40 MG/ML  Patient tolerance: patient tolerated the procedure well with no immediate complications    This injection documentation was Scribed for Larry Ashby MD by Blank Skelton.  11/22/24   15:59 EST        Imaging Results (Most  Recent)       Procedure Component Value Units Date/Time    XR Scapula Right [[id number]] Resulted: 11/22/24 1522     Updated: 11/22/24 1523    XR Scapula Left [[id number]] Resulted: 11/22/24 1522     Updated: 11/22/24 1523             Result Review :         X-Ray Report:  Right scapula X-Ray  Indication: Evaluation of the right scapula  AP/Lateral view(s)  Findings: Severe AC joint arthritis with bone spurring noted.    Prior studies available for comparison: no     X-Ray Report:  Left scapula X-Ray  Indication: Evaluation of the left scapula  AP/Lateral view(s)  Findings: Moderate AC joint arthritis.    Prior studies available for comparison: no              Assessment and Plan     Diagnoses and all orders for this visit:    1. Right shoulder pain, unspecified chronicity (Primary)  -     XR Scapula Right    2. Left shoulder pain, unspecified chronicity  -     XR Scapula Left    3. Arthritis of both acromioclavicular joints        Discussed the treatment plan with the patient. I reviewed the X-rays that were obtained today with the patient.     Discussed the risks and benefits of conservative measures. The patient expressed understanding and wished to proceed with bilateral shoulder steroid injections.  He tolerated the injections well.       Call or return if worsening symptoms.    Follow Up     PRN      Patient was given instructions and counseling regarding his condition or for health maintenance advice. Please see specific information pulled into the AVS if appropriate.     Scribed for Larry Ashby MD by Pastora Ba MA.  11/22/24   15:21 EST    I have personally performed the services described in this document as scribed by the above individual and it is both accurate and complete. Larry Ashby MD 11/22/24

## 2025-01-28 RX ORDER — ATENOLOL 25 MG/1
25 TABLET ORAL DAILY
Qty: 90 TABLET | Refills: 3 | OUTPATIENT
Start: 2025-01-28

## 2025-02-05 NOTE — PROGRESS NOTES
[facility]  Cardiology progress Note    Patient Name: Beltran Ambrose  : 1955    CHIEF COMPLAINT  Atrial fibrillation        Subjective   Subjective     HISTORY OF PRESENT ILLNESS    Beltran Ambrose is a 69 y.o. male with history of atrial fibrillation.  No palpitations.    REVIEW OF SYSTEMS    Constitutional:    No fever, no weight loss  Skin:     No rash  Otolaryngeal:    No difficulty swallowing  Cardiovascular: See HPI.  Pulmonary:    No cough, no sputum production    Personal History     Social History:    reports that he has quit smoking. He has never used smokeless tobacco. He reports that he does not drink alcohol and does not use drugs.    Home Medications:  Current Outpatient Medications on File Prior to Visit   Medication Sig    Allopurinol 200 MG tablet Take 200 mg by mouth Daily.    amitriptyline (ELAVIL) 50 MG tablet Take 1 tablet by mouth Daily.    amLODIPine (NORVASC) 10 MG tablet Take 1 tablet by mouth Daily.    ZyH06-Mppjnkryze-Dajdxddjj -100 MG capsule Take 2 capsules by mouth 2 (Two) Times a Day.    Cyanocobalamin 1000 MCG capsule Take 1 tablet by mouth Daily.    dapagliflozin Propanediol (Farxiga) 10 MG tablet Take 10 mg by mouth Daily.    Diclofenac Sodium (VOLTAREN) 1 % gel gel Apply 4 g topically to the appropriate area as directed 4 (Four) Times a Day.    Eliquis 5 MG tablet tablet TAKE 1 TABLET BY MOUTH TWICE  DAILY    ergocalciferol (ERGOCALCIFEROL) 1.25 MG (62885 UT) capsule Take 1 capsule by mouth 3 (Three) Times a Week. mwf    fenofibrate 160 MG tablet Take 1 tablet by mouth Every Evening.    furosemide (LASIX) 20 MG tablet     HYDROcodone-acetaminophen (NORCO) 7.5-325 MG per tablet Take 1 tablet by mouth Every 8 (Eight) Hours As Needed.    Inclisiran Sodium 284 MG/1.5ML solution prefilled syringe Inject  under the skin into the appropriate area as directed. Gets injections at [facility], next dose is 2023    Insulin Glargine (Lantus SoloStar)  100 UNIT/ML injection pen Inject 55 Units under the skin into the appropriate area as directed Daily.    Insulin Lispro, 1 Unit Dial, (HUMALOG) 100 UNIT/ML solution pen-injector Inject  under the skin into the appropriate area as directed 3 (Three) Times a Day. Per sliding scale    Kerendia 10 MG tablet     levothyroxine (SYNTHROID, LEVOTHROID) 175 MCG tablet Take 1 tablet by mouth Every Other Day.    Magnesium 200 MG tablet Take 200 mg by mouth 2 (Two) Times a Day.    metoclopramide (REGLAN) 10 MG tablet Take 1 tablet by mouth Daily.    metroNIDAZOLE (FLAGYL) 500 MG tablet     nitroglycerin (NITROSTAT) 0.4 MG SL tablet DISSOLVE 1 TABLET UNDER THE  TONGUE EVERY 5 MINUTES AS NEEDED FOR CHEST PAIN. MAX OF 3 TABLETS IN 15 MINUTES. CALL 911 IF PAIN  PERSISTS.    Omega-3 Fatty Acids (fish oil) 1000 MG capsule capsule Take 1,200 mg by mouth 2 (Two) Times a Day With Meals.    oxymetazoline (AFRIN) 0.05 % nasal spray Administer 2 sprays into the nostril(s) as directed by provider Daily.    pantoprazole (PROTONIX) 40 MG EC tablet Take 1 tablet by mouth Daily.    rosuvastatin (CRESTOR) 10 MG tablet Take 1 tablet by mouth Daily.    sodium polystyrene (KAYEXALATE) powder Take 30 g by mouth As Needed.    [DISCONTINUED] hydroCHLOROthiazide (MICROZIDE) 12.5 MG capsule Take 1 capsule by mouth Daily. (Patient not taking: Reported on 2/6/2025)     No current facility-administered medications on file prior to visit.       Past Medical History:   Diagnosis Date    Atrial fibrillation     CAD s/p CABG 11/14/2021    Myocardial infarction        Allergies:  Allergies   Allergen Reactions    Oxycodone Dizziness and Other (See Comments)    Oxycodone Hcl Dizziness    Azithromycin Unknown - Low Severity    Erythromycin Nausea And Vomiting     NOT an allergy    Gabapentin Other (See Comments)     Weakness and jitters    NOT an allergy    Oxychlorosene Dizziness    Cetirizine Rash and Other (See Comments)    Dopamine Other (See Comments)      "hypotension  Action Taken: drops BP;   hypotension         Objective    Objective       Vitals:   Heart Rate:  [77] 77  BP: (125)/(69) 125/69  Body mass index is 33.76 kg/m².     PHYSICAL EXAM:    General Appearance:   well developed  well nourished  HENT:   oropharynx moist  lips not cyanotic  Neck:  thyroid not enlarged  supple  Respiratory:  no respiratory distress  normal breath sounds  no rales  Cardiovascular:  no jugular venous distention  regular rhythm  apical impulse normal  S1 normal, S2 normal  no S3, no S4   no murmur  no rub, no thrill  carotid pulses normal; no bruit  pedal pulses normal  lower extremity edema: none    Skin:   warm, dry  Psychiatric:  judgement and insight appropriate  normal mood and affect        Result Review:  I have personally reviewed the available results from  [x]  Laboratory  [x]  EKG  [x]  Cardiology  [x]  Medications  [x]  Old records  []  Other:       ECG 12 Lead    Date/Time: 2/6/2025 11:21 AM  Performed by: Edgar Sprague MD    Authorized by: Edgar Sprague MD  Comparison: compared with previous ECG   Similar to previous ECG  Rhythm: sinus rhythm  Rate: normal  QRS axis: normal  Other findings: non-specific ST-T wave changes  Comments: Normal sinus rhythm.  No significant changes compared to previous EKG.        No results found for: \"CHOL\"  Lab Results   Component Value Date    TRIG 297 (H) 01/16/2025    TRIG 676 (H) 10/16/2024    TRIG 337 (H) 07/18/2024     Lab Results   Component Value Date    HDL 23 01/16/2025    HDL 19 (L) 10/16/2024    HDL 21 (L) 07/18/2024     Lab Results   Component Value Date    LDL 7 01/16/2025    LDL UNABLE TO CALCULATE 01/16/2025    LDL 15 10/16/2024    LDL  10/16/2024     Unable to calc due to high Trig  Direct LDL ordered     Lab Results   Component Value Date    VLDL UNABLE TO CALCULATE 07/04/2018    VLDL UNABLE TO CALCULATE 07/03/2018     Results for orders placed in visit on 01/28/22    Adult Transthoracic Echo Complete W/ " Cont if Necessary Per Protocol    Interpretation Summary  Fibrocalcific mitral and aortic valves.  Mild aortic regurgitation.  Trace MR and trace TR.  Dilated left ventricle with normal left ventricular systolic function.     Impression/Plan:   1.  Paroxysmal atrial fibrillation : Continue Eliquis 5 mg twice a day for stroke prevention.  Atenolol and Cardizem stopped because of bradycardia.  2.  Coronary s/p CABG stable: .  No chest pain.  3.  Mixed hyperlipidemia: Continue Crestor 10 mg a day.  Continue fenofibrate 160 mg a day.  Monitor lipid and hepatic profile.  Superhigh hypertriglyceridemia managed by his endocrinologist.  4.  Essential hypertension uncontrolled: .  Continue amlodipine 10 mg once a day.  Add hydralazine 25 mg twice a day.  Monitor blood pressure regularly           Edgar Sprague MD   02/06/25   11:14 EST

## 2025-02-06 ENCOUNTER — OFFICE VISIT (OUTPATIENT)
Dept: CARDIOLOGY | Facility: CLINIC | Age: 70
End: 2025-02-06
Payer: MEDICARE

## 2025-02-06 VITALS
WEIGHT: 249 LBS | HEIGHT: 72 IN | DIASTOLIC BLOOD PRESSURE: 69 MMHG | HEART RATE: 77 BPM | SYSTOLIC BLOOD PRESSURE: 125 MMHG | BODY MASS INDEX: 33.72 KG/M2

## 2025-02-06 DIAGNOSIS — I48.0 PAROXYSMAL ATRIAL FIBRILLATION: Primary | ICD-10-CM

## 2025-02-06 DIAGNOSIS — I10 ESSENTIAL (PRIMARY) HYPERTENSION: ICD-10-CM

## 2025-02-06 DIAGNOSIS — Z95.1 HX OF CABG: ICD-10-CM

## 2025-02-06 DIAGNOSIS — I25.10 CORONARY ARTERY DISEASE INVOLVING NATIVE CORONARY ARTERY OF NATIVE HEART WITHOUT ANGINA PECTORIS: ICD-10-CM

## 2025-02-06 DIAGNOSIS — E78.2 HYPERLIPEMIA, MIXED: ICD-10-CM

## 2025-02-06 PROCEDURE — 3074F SYST BP LT 130 MM HG: CPT | Performed by: SPECIALIST

## 2025-02-06 PROCEDURE — 3078F DIAST BP <80 MM HG: CPT | Performed by: SPECIALIST

## 2025-02-06 RX ORDER — METRONIDAZOLE 500 MG/1
TABLET ORAL
COMMUNITY
Start: 2024-12-22

## 2025-02-06 RX ORDER — FINERENONE 10 MG/1
TABLET, FILM COATED ORAL
COMMUNITY
Start: 2024-12-10

## 2025-02-06 RX ORDER — FUROSEMIDE 20 MG/1
TABLET ORAL
COMMUNITY
Start: 2025-01-02

## 2025-02-06 RX ORDER — HYDRALAZINE HYDROCHLORIDE 25 MG/1
25 TABLET, FILM COATED ORAL 2 TIMES DAILY
Qty: 180 TABLET | Refills: 5 | Status: SHIPPED | OUTPATIENT
Start: 2025-02-06

## 2025-02-06 RX ORDER — HYDRALAZINE HYDROCHLORIDE 50 MG/1
50 TABLET, FILM COATED ORAL 3 TIMES DAILY
Qty: 270 TABLET | Refills: 3 | Status: SHIPPED | OUTPATIENT
Start: 2025-02-06 | End: 2025-02-06 | Stop reason: SDUPTHER

## 2025-02-07 ENCOUNTER — TELEPHONE (OUTPATIENT)
Dept: CARDIOLOGY | Facility: CLINIC | Age: 70
End: 2025-02-07
Payer: MEDICARE

## 2025-02-07 NOTE — TELEPHONE ENCOUNTER
Called [facility], spoke to Yumiko (pharmacist). Clarified script should be Hydralazine 25 mg BID per last office note

## 2025-02-07 NOTE — TELEPHONE ENCOUNTER
[facility] received a fax that requires your attention. The document has been indexed to the patient’s chart for your review.      Reason for sending: EXTERNAL MEDICAL RECORD NOTIFICATION     Documents Description: CLARIFICATION REQ-[facility]-2.7.25    Name of Sender: [facility]     Date Indexed: 2.7.25

## 2025-02-12 RX ORDER — ATENOLOL 25 MG/1
25 TABLET ORAL DAILY
Qty: 90 TABLET | Refills: 3 | OUTPATIENT
Start: 2025-02-12

## 2025-02-18 RX ORDER — APIXABAN 5 MG/1
5 TABLET, FILM COATED ORAL EVERY 12 HOURS SCHEDULED
Qty: 180 TABLET | Refills: 3 | Status: SHIPPED | OUTPATIENT
Start: 2025-02-18

## 2025-02-26 ENCOUNTER — OFFICE VISIT (OUTPATIENT)
Dept: ORTHOPEDIC SURGERY | Facility: CLINIC | Age: 70
End: 2025-02-26
Payer: MEDICARE

## 2025-02-26 VITALS — BODY MASS INDEX: 33.72 KG/M2 | WEIGHT: 249 LBS | HEIGHT: 72 IN

## 2025-02-26 DIAGNOSIS — M25.511 RIGHT SHOULDER PAIN, UNSPECIFIED CHRONICITY: ICD-10-CM

## 2025-02-26 DIAGNOSIS — M25.512 LEFT SHOULDER PAIN, UNSPECIFIED CHRONICITY: ICD-10-CM

## 2025-02-26 DIAGNOSIS — M19.012 ARTHRITIS OF BOTH ACROMIOCLAVICULAR JOINTS: Primary | ICD-10-CM

## 2025-02-26 DIAGNOSIS — M19.011 ARTHRITIS OF BOTH ACROMIOCLAVICULAR JOINTS: Primary | ICD-10-CM

## 2025-02-26 RX ADMIN — TRIAMCINOLONE ACETONIDE 40 MG: 40 INJECTION, SUSPENSION INTRA-ARTICULAR; INTRAMUSCULAR at 15:41

## 2025-02-26 RX ADMIN — LIDOCAINE HYDROCHLORIDE 5 ML: 10 INJECTION, SOLUTION INFILTRATION; PERINEURAL at 15:41

## 2025-02-26 NOTE — PROGRESS NOTES
"Chief Complaint  Follow-up of the Left Shoulder and Follow-up of the Right Shoulder    Subjective      Beltran Ambrose presents to [facility] for follow up of their bilateral shoulders.  Patient has bilateral shoulder pain and osteoarthritis that he has tried to manage conservatively with intermittent injections.  He was last seen in office by Dr. Ashby on 11/22/2024 where he received bilateral shoulder steroid injections.  He returns to clinic today stating his shoulders are bothering him.  He states the previous injection only lasted approximately a month.  Due to severe cardiac issues he is not a candidate for surgery so he requests repeat injections today in office.    Allergies   Allergen Reactions    Oxycodone Dizziness and Other (See Comments)    Oxycodone Hcl Dizziness    Azithromycin Unknown - Low Severity    Erythromycin Nausea And Vomiting     NOT an allergy    Gabapentin Other (See Comments)     Weakness and jitters    NOT an allergy    Oxychlorosene Dizziness    Cetirizine Rash and Other (See Comments)    Dopamine Other (See Comments)     hypotension  Action Taken: drops BP;   hypotension         Objective     Vital Signs:   Vitals:    02/26/25 1516   Weight: 113 kg (249 lb)   Height: 182.9 cm (72.01\")     Body mass index is 33.76 kg/m².    I reviewed the patient's chief complaint, history of present illness, review of systems, past medical history, surgical history, family history, social history, medications, and allergy list.     Ortho Exam  Shoulder   General: Alert. No acute distress.  Bilateral upper Extremity:  not tender to palpation. No skin discoloration, atrophy, or swelling. 140 degrees active elevation. External rotation to 45 degrees. Internal rotation to side pocket. Demonstrates intact active elbow ROM. Demonstrates intact active wrist ROM. Sensation intact. Palpable radial pulse. Neurovascularly intact.      Right shoulder: R subacromial bursa  Date/Time: " 2/26/2025 3:41 PM  Consent given by: patient  Site marked: site marked  Timeout: Immediately prior to procedure a time out was called to verify the correct patient, procedure, equipment, support staff and site/side marked as required   Supporting Documentation  Indications: pain   Procedure Details  Location: shoulder - R subacromial bursa  Needle gauge: 21 G.  Medications administered: 40 mg triamcinolone acetonide 40 MG/ML; 5 mL lidocaine 1 %  Patient tolerance: patient tolerated the procedure well with no immediate complications      Left shoulder: L subacromial bursa  Date/Time: 2/26/2025 3:41 PM  Consent given by: patient  Site marked: site marked  Timeout: Immediately prior to procedure a time out was called to verify the correct patient, procedure, equipment, support staff and site/side marked as required   Supporting Documentation  Indications: pain   Procedure Details  Location: shoulder - L subacromial bursa  Needle gauge: 21 G.  Medications administered: 5 mL lidocaine 1 %; 40 mg triamcinolone acetonide 40 MG/ML  Patient tolerance: patient tolerated the procedure well with no immediate complications      This injection documentation was Scribed for LIZZ Liu by Maryann Gutierres MA.  02/26/25   15:42 EST      Imaging Results (Most Recent)       None                Assessment and Plan   Diagnoses and all orders for this visit:    1. Arthritis of both acromioclavicular joints (Primary)  -     right shoulder: R subacromial bursa  -     left shoulder: L subacromial bursa    2. Left shoulder pain, unspecified chronicity    3. Right shoulder pain, unspecified chronicity         Beltran Ambrose presents today to [facility] for the follow up of their bilateral shoulders. They have bilateral shoulder pain and osteoarthritis that we have been treating conservatively with home exercises and intermittent injections.  His last injections were on 11/22/2024.  He is eligible for repeat injections  today and he returns to clinic today requesting repeat injections.    We discussed the risks, benefits and alternatives of injections. Patient was informed of possible adverse effects including but not limited to bleeding, damage to nerve, tendon or artery, increased blood sugar and increased blood pressure. Discussed possibility of a reaction from the injection.  Discussed the possibility that the injection may not completely improve or remove the pain.  Discussed the risk of infection.  Discussed the possibility of worsening pain after the injection.  Informed consent obtained.  Time out was performed.  Chlorhexidine was swabbed at injection site per typical technique. Needle injected into bursa, aspiration was performed and then bilateral shoulder steroid slowly injected into joint space, fluid was free flowing. Needle was removed, band-aid placed to injection site.  Patient tolerated injection well with no complications.     Follow up in 3 months.  Eligible for repeat bilateral shoulder steroid injections at that time.        Tobacco Use: Medium Risk (2/26/2025)    Patient History     Smoking Tobacco Use: Former     Smokeless Tobacco Use: Never     Passive Exposure: Not on file     Patient reports they have a history of tobacco use; encouraged continued tobacco cessation for further health benefits.           Follow Up   Return in about 3 months (around 5/26/2025).  There are no Patient Instructions on file for this visit.    Patient was given instructions and counseling regarding his condition or for health maintenance advice. Please see specific information pulled into the AVS if appropriate.     Dictated Utilizing Dragon Dictation. Please note that portions of this note were completed with a voice recognition program. Part of this note may be an electronic transcription/translation of spoken language to printed text using the Dragon Dictation System.

## 2025-02-28 RX ORDER — TRIAMCINOLONE ACETONIDE 40 MG/ML
40 INJECTION, SUSPENSION INTRA-ARTICULAR; INTRAMUSCULAR
Status: COMPLETED | OUTPATIENT
Start: 2025-02-26 | End: 2025-02-26

## 2025-02-28 RX ORDER — LIDOCAINE HYDROCHLORIDE 10 MG/ML
5 INJECTION, SOLUTION INFILTRATION; PERINEURAL
Status: COMPLETED | OUTPATIENT
Start: 2025-02-26 | End: 2025-02-26

## 2025-05-14 RX ORDER — NITROGLYCERIN 0.4 MG/1
TABLET SUBLINGUAL
Qty: 100 TABLET | Refills: 3 | Status: SHIPPED | OUTPATIENT
Start: 2025-05-14

## 2025-05-28 ENCOUNTER — OFFICE VISIT (OUTPATIENT)
Dept: ORTHOPEDIC SURGERY | Facility: CLINIC | Age: 70
End: 2025-05-28
Payer: MEDICARE

## 2025-05-28 VITALS
BODY MASS INDEX: 33.72 KG/M2 | SYSTOLIC BLOOD PRESSURE: 164 MMHG | OXYGEN SATURATION: 94 % | WEIGHT: 249 LBS | HEART RATE: 71 BPM | HEIGHT: 72 IN | DIASTOLIC BLOOD PRESSURE: 66 MMHG

## 2025-05-28 DIAGNOSIS — M19.012 ARTHRITIS OF BOTH ACROMIOCLAVICULAR JOINTS: Primary | ICD-10-CM

## 2025-05-28 DIAGNOSIS — M19.012 PRIMARY OSTEOARTHRITIS OF BOTH SHOULDERS: ICD-10-CM

## 2025-05-28 DIAGNOSIS — M19.011 ARTHRITIS OF BOTH ACROMIOCLAVICULAR JOINTS: Primary | ICD-10-CM

## 2025-05-28 DIAGNOSIS — M19.011 PRIMARY OSTEOARTHRITIS OF BOTH SHOULDERS: ICD-10-CM

## 2025-05-28 RX ORDER — TRIAMCINOLONE ACETONIDE 40 MG/ML
40 INJECTION, SUSPENSION INTRA-ARTICULAR; INTRAMUSCULAR
Status: COMPLETED | OUTPATIENT
Start: 2025-05-28 | End: 2025-05-28

## 2025-05-28 RX ORDER — LIDOCAINE HYDROCHLORIDE 10 MG/ML
5 INJECTION, SOLUTION INFILTRATION; PERINEURAL
Status: COMPLETED | OUTPATIENT
Start: 2025-05-28 | End: 2025-05-28

## 2025-05-28 RX ADMIN — LIDOCAINE HYDROCHLORIDE 5 ML: 10 INJECTION, SOLUTION INFILTRATION; PERINEURAL at 11:20

## 2025-05-28 RX ADMIN — TRIAMCINOLONE ACETONIDE 40 MG: 40 INJECTION, SUSPENSION INTRA-ARTICULAR; INTRAMUSCULAR at 11:20

## 2025-05-28 NOTE — PROGRESS NOTES
"Chief Complaint  Follow-up of the Left Shoulder and Follow-up of the Right Shoulder    Subjective      Beltran Ambrose presents to [facility]     History of Present Illness  The patient is here today following up on his bilateral shoulders. He has bilateral shoulder pain and osteoarthritis that he has tried to manage conservatively with intermittent injections. He was last seen in the office on 02/26/2025 and received bilateral shoulder steroid injections. He is not a candidate for surgery at this time, so conservative treatment will continue.    He reports experiencing intermittent pain in his left shoulder, which he rates as a 2 on the pain scale. His right shoulder, however, has been consistently painful, rated at a 4, for the past month. He acknowledges that the previous injections provided temporary relief. He expresses a preference for scheduled appointments every 3 months for these injections. No new symptoms or changes in his condition are reported      Allergies   Allergen Reactions    Oxycodone Dizziness and Other (See Comments)    Oxycodone Hcl Dizziness    Azithromycin Unknown - Low Severity    Erythromycin Nausea And Vomiting     NOT an allergy    Gabapentin Other (See Comments)     Weakness and jitters    NOT an allergy    Oxychlorosene Dizziness    Cetirizine Rash and Other (See Comments)    Dopamine Other (See Comments)     hypotension  Action Taken: drops BP;   hypotension         Objective     Vital Signs:   Vitals:    05/28/25 1045   BP: 164/66   Pulse: 71   SpO2: 94%   Weight: 113 kg (249 lb)   Height: 182.9 cm (72.01\")     Body mass index is 33.76 kg/m².    I reviewed the patient's chief complaint, history of present illness, review of systems, past medical history, surgical history, family history, social history, medications, and allergy list.     Ortho Exam    General: Alert. No acute distress.  Left Upper Extremity:  tender to palpation. No skin discoloration, " atrophy, or swelling. 120 degrees active elevation. External rotation to 30 degrees. Internal rotation to back pocket.  Demonstrates intact active elbow ROM. Demonstrates intact active wrist ROM. Sensation intact. Palpable radial pulse. Neurovascularly intact.          Large Joint Arthrocentesis: R subacromial bursa  Date/Time: 5/28/2025 11:20 AM  Consent given by: patient  Site marked: site marked  Timeout: Immediately prior to procedure a time out was called to verify the correct patient, procedure, equipment, support staff and site/side marked as required   Supporting Documentation  Indications: pain   Procedure Details  Location: shoulder - R subacromial bursa  Preparation: Patient was prepped and draped in the usual sterile fashion  Needle gauge: 21 G.  Medications administered: 5 mL lidocaine 1 %; 40 mg triamcinolone acetonide 40 MG/ML  Patient tolerance: patient tolerated the procedure well with no immediate complications      Large Joint Arthrocentesis: L subacromial bursa  Date/Time: 5/28/2025 11:20 AM  Consent given by: patient  Site marked: site marked  Timeout: Immediately prior to procedure a time out was called to verify the correct patient, procedure, equipment, support staff and site/side marked as required   Supporting Documentation  Indications: pain   Procedure Details  Location: shoulder - L subacromial bursa  Preparation: Patient was prepped and draped in the usual sterile fashion  Needle gauge: 21 G.  Medications administered: 5 mL lidocaine 1 %; 40 mg triamcinolone acetonide 40 MG/ML  Patient tolerance: patient tolerated the procedure well with no immediate complications       This injection documentation was Scribed for LIZZ Liu by BLAKE Rothman.  05/28/25   11:20 EDT      Imaging Results (Most Recent)       None             Results           Assessment and Plan   Diagnoses and all orders for this visit:    1. Arthritis of both acromioclavicular joints (Primary)    2.  Primary osteoarthritis of both shoulders    Other orders  -     Large Joint Arthrocentesis: R subacromial bursa  -     Large Joint Arthrocentesis: L subacromial bursa             Assessment & Plan  1. Bilateral shoulder pain:     They have bilateral shoulder pain and osteoarthritis that we have been treating conservatively with rumen injections. Patient elected to proceed with repeat bilateral shoulder steroid injections. Patient is previously aware of the risks, benefits and alternatives of injections. Patient was informed of possible adverse effects including but not limited to bleeding, damage to nerve, tendon or artery, increased blood sugar and increased blood pressure. Discussed possibility of a reaction from the injection.  Discussed the possibility that the injection may not completely improve or remove the pain.  Discussed the risk of infection.  Discussed the possibility of worsening pain after the injection.  Informed consent obtained.  Time out was performed. Chlorhexidine was swabbed at injection site per typical technique. Needle injected into bursa, aspiration was performed and then bilateral shoulder steroid  slowly injected into joint space, fluid was free flowing. Needle was removed, band-aid placed to injection site.  Patient tolerated injection well with no complications. Additional information was provided on at checkout.     Continue conservative treatment with scheduled appointments every three months for injections. Maintain good muscle tone in the arms to support the shoulders. Discuss any changes or new symptoms during follow-up visits.    Follow-up  Follow up in 3 months; repeat injections at that time.      Follow Up   Return in about 3 months (around 8/28/2025).  There are no Patient Instructions on file for this visit.    Patient was given instructions and counseling regarding his condition or for health maintenance advice. Please see specific information pulled into the AVS if  appropriate.     Patient or patient representative verbalized consent for the use of Ambient Listening during the visit with  LIZZ Liu for chart documentation. 5/28/2025  12:00 EDT    Dictated Utilizing Dragon Dictation. Please note that portions of this note were completed with a voice recognition program. Part of this note may be an electronic transcription/translation of spoken language to printed text using the Dragon Dictation System.

## 2025-07-05 DIAGNOSIS — I10 HYPERTENSION, ESSENTIAL: ICD-10-CM

## 2025-07-07 RX ORDER — AMLODIPINE BESYLATE 10 MG/1
10 TABLET ORAL DAILY
Qty: 90 TABLET | Refills: 3 | Status: SHIPPED | OUTPATIENT
Start: 2025-07-07

## 2025-08-07 ENCOUNTER — OFFICE VISIT (OUTPATIENT)
Dept: CARDIOLOGY | Facility: CLINIC | Age: 70
End: 2025-08-07
Payer: MEDICARE

## 2025-08-07 VITALS
BODY MASS INDEX: 34.21 KG/M2 | HEIGHT: 72 IN | WEIGHT: 252.6 LBS | HEART RATE: 77 BPM | SYSTOLIC BLOOD PRESSURE: 146 MMHG | DIASTOLIC BLOOD PRESSURE: 73 MMHG

## 2025-08-07 DIAGNOSIS — Z95.1 HX OF CABG: ICD-10-CM

## 2025-08-07 DIAGNOSIS — I25.10 CORONARY ARTERY DISEASE INVOLVING NATIVE CORONARY ARTERY OF NATIVE HEART WITHOUT ANGINA PECTORIS: ICD-10-CM

## 2025-08-07 DIAGNOSIS — I10 HYPERTENSION, ESSENTIAL: ICD-10-CM

## 2025-08-07 DIAGNOSIS — I48.0 PAROXYSMAL ATRIAL FIBRILLATION: Primary | ICD-10-CM

## 2025-08-07 PROCEDURE — 3078F DIAST BP <80 MM HG: CPT | Performed by: SPECIALIST

## 2025-08-07 PROCEDURE — 1160F RVW MEDS BY RX/DR IN RCRD: CPT | Performed by: SPECIALIST

## 2025-08-07 PROCEDURE — 99214 OFFICE O/P EST MOD 30 MIN: CPT | Performed by: SPECIALIST

## 2025-08-07 PROCEDURE — 3077F SYST BP >= 140 MM HG: CPT | Performed by: SPECIALIST

## 2025-08-07 PROCEDURE — 1159F MED LIST DOCD IN RCRD: CPT | Performed by: SPECIALIST

## 2025-08-07 RX ORDER — PANCRELIPASE 36000; 180000; 114000 [USP'U]/1; [USP'U]/1; [USP'U]/1
CAPSULE, DELAYED RELEASE PELLETS ORAL
COMMUNITY
Start: 2025-05-07

## 2025-08-07 RX ORDER — ALLOPURINOL 100 MG/1
100 TABLET ORAL
COMMUNITY
Start: 2025-05-19

## 2025-08-07 RX ORDER — HYDROCHLOROTHIAZIDE 25 MG/1
25 TABLET ORAL EVERY EVENING
COMMUNITY
Start: 2025-03-04

## 2025-08-07 RX ORDER — ROSUVASTATIN CALCIUM 20 MG/1
TABLET, COATED ORAL
COMMUNITY
Start: 2025-06-26